# Patient Record
Sex: FEMALE | Race: WHITE | NOT HISPANIC OR LATINO | Employment: FULL TIME | URBAN - METROPOLITAN AREA
[De-identification: names, ages, dates, MRNs, and addresses within clinical notes are randomized per-mention and may not be internally consistent; named-entity substitution may affect disease eponyms.]

---

## 2023-03-10 PROBLEM — R32 URINARY INCONTINENCE: Status: ACTIVE | Noted: 2023-03-10

## 2023-03-10 PROBLEM — E55.9 VITAMIN D DEFICIENCY: Status: ACTIVE | Noted: 2023-03-10

## 2023-03-10 PROBLEM — N83.209 CYST OF OVARY: Status: ACTIVE | Noted: 2023-03-10

## 2023-03-10 PROBLEM — N83.2 CYST OF OVARY: Status: ACTIVE | Noted: 2023-03-10

## 2023-03-10 PROBLEM — R31.21 ASYMPTOMATIC MICROSCOPIC HEMATURIA: Status: ACTIVE | Noted: 2023-03-10

## 2023-03-10 PROBLEM — F51.04 CHRONIC INSOMNIA: Status: ACTIVE | Noted: 2023-03-10

## 2023-03-10 PROBLEM — D72.819 WBC DECREASED: Status: ACTIVE | Noted: 2023-03-10

## 2023-03-10 PROBLEM — M54.16 LUMBAR RADICULITIS: Status: ACTIVE | Noted: 2023-03-10

## 2023-03-10 PROBLEM — K21.9 CHRONIC GERD: Status: ACTIVE | Noted: 2023-03-10

## 2023-03-10 PROBLEM — R03.0 BLOOD PRESSURE ELEVATED WITHOUT HISTORY OF HTN: Status: ACTIVE | Noted: 2023-03-10

## 2023-03-10 PROBLEM — K58.2 IRRITABLE BOWEL SYNDROME WITH BOTH CONSTIPATION AND DIARRHEA: Status: ACTIVE | Noted: 2023-03-10

## 2023-03-10 PROBLEM — G43.909 MIGRAINE WITHOUT STATUS MIGRAINOSUS, NOT INTRACTABLE: Status: ACTIVE | Noted: 2023-03-10

## 2023-03-10 PROBLEM — Z86.19 HISTORY OF HPV INFECTION: Status: ACTIVE | Noted: 2023-03-10

## 2023-03-10 PROBLEM — J30.9 ALLERGIC RHINITIS: Status: ACTIVE | Noted: 2023-03-10

## 2023-03-10 RX ORDER — ACETAMINOPHEN 500 MG
2 TABLET ORAL
COMMUNITY
Start: 2021-05-06

## 2023-03-10 RX ORDER — MELATONIN 5 MG
1 CAPSULE ORAL NIGHTLY
COMMUNITY
Start: 2019-03-08 | End: 2023-04-17 | Stop reason: ALTCHOICE

## 2023-03-10 RX ORDER — CELECOXIB 100 MG/1
100 CAPSULE ORAL DAILY PRN
COMMUNITY
Start: 2020-10-30

## 2023-03-15 ENCOUNTER — OFFICE VISIT (OUTPATIENT)
Dept: PRIMARY CARE | Facility: CLINIC | Age: 58
End: 2023-03-15
Payer: COMMERCIAL

## 2023-03-15 VITALS
DIASTOLIC BLOOD PRESSURE: 73 MMHG | HEART RATE: 77 BPM | BODY MASS INDEX: 24.24 KG/M2 | WEIGHT: 141.2 LBS | TEMPERATURE: 98.1 F | OXYGEN SATURATION: 98 % | SYSTOLIC BLOOD PRESSURE: 115 MMHG

## 2023-03-15 DIAGNOSIS — M79.641 RIGHT HAND PAIN: Primary | ICD-10-CM

## 2023-03-15 PROCEDURE — 1036F TOBACCO NON-USER: CPT | Performed by: NURSE PRACTITIONER

## 2023-03-15 PROCEDURE — 99213 OFFICE O/P EST LOW 20 MIN: CPT | Performed by: NURSE PRACTITIONER

## 2023-03-15 ASSESSMENT — ENCOUNTER SYMPTOMS
TINGLING: 0
NUMBNESS: 0

## 2023-03-15 ASSESSMENT — PATIENT HEALTH QUESTIONNAIRE - PHQ9
SUM OF ALL RESPONSES TO PHQ9 QUESTIONS 1 AND 2: 0
1. LITTLE INTEREST OR PLEASURE IN DOING THINGS: NOT AT ALL
2. FEELING DOWN, DEPRESSED OR HOPELESS: NOT AT ALL

## 2023-03-15 NOTE — PROGRESS NOTES
Subjective   Patient ID: Lucio Goetz is a 57 y.o. female who presents for Hand Pain (Right Hand Pain x 1.5 months/Worsening/C/O of Heart burn/chest  pain in the waiting room today /States it could be food/drink related/Denies SOB//).    Hx of heart burn  Intermittent  Tums help  No other associated symptoms      Hand Pain   The incident occurred more than 1 week ago. The injury mechanism was repetitive motion. The pain is present in the right hand. The quality of the pain is described as shooting. The pain does not radiate. The pain is at a severity of 6/10. The pain is moderate. The pain has been Intermittent since the incident. Pertinent negatives include no numbness or tingling. The symptoms are aggravated by movement. She has tried nothing for the symptoms.       ROS completely negative except what was mentioned in the HPI.  Problem List, surgical, social, and family histories which were reviewed and updated as necessary within the EMR. I also personally reviewed the notes, labs, and imaging that pertained to what was documented or discussed in the HPI.      Objective   Physical Exam  Constitutional:       Appearance: Normal appearance. She is normal weight.   HENT:      Head: Normocephalic and atraumatic.      Right Ear: External ear normal.      Left Ear: External ear normal.      Nose: Nose normal.   Eyes:      Extraocular Movements: Extraocular movements intact.      Conjunctiva/sclera: Conjunctivae normal.      Pupils: Pupils are equal, round, and reactive to light.   Cardiovascular:      Rate and Rhythm: Normal rate and regular rhythm.      Heart sounds: Normal heart sounds.   Pulmonary:      Effort: Pulmonary effort is normal.      Breath sounds: Normal breath sounds.   Musculoskeletal:         General: No swelling or deformity.      Cervical back: Normal range of motion.      Comments: Pain on extension of middle finger, pain with reverse phalen's, normal phalens   Skin:     General: Skin is warm  and dry.   Neurological:      General: No focal deficit present.      Mental Status: She is alert and oriented to person, place, and time. Mental status is at baseline.   Psychiatric:         Mood and Affect: Mood normal.         Behavior: Behavior normal.         Thought Content: Thought content normal.         Judgment: Judgment normal.         /73 (BP Location: Left arm)   Pulse 77   Temp 36.7 °C (98.1 °F)   Wt 64 kg (141 lb 3.2 oz)   SpO2 98%   BMI 24.24 kg/m²     Assessment/Plan   Problem List Items Addressed This Visit          Musculoskeletal    Right hand pain - Primary     Will get XR  Unable to determine if pain originating in middle finger tendon or wrist  Ortho referral         Relevant Orders    XR hand right 1-2 views    Orthopaedic Injury Treatment

## 2023-03-15 NOTE — ASSESSMENT & PLAN NOTE
Will get XR  Unable to determine if pain originating in middle finger tendon or wrist  Ortho referral

## 2023-04-17 ENCOUNTER — OFFICE VISIT (OUTPATIENT)
Dept: PRIMARY CARE | Facility: CLINIC | Age: 58
End: 2023-04-17
Payer: COMMERCIAL

## 2023-04-17 VITALS
SYSTOLIC BLOOD PRESSURE: 138 MMHG | OXYGEN SATURATION: 99 % | DIASTOLIC BLOOD PRESSURE: 64 MMHG | WEIGHT: 145.8 LBS | BODY MASS INDEX: 25.03 KG/M2 | HEART RATE: 82 BPM | TEMPERATURE: 98.3 F
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
## 2023-04-17 DIAGNOSIS — S20.461A TICK BITE OF RIGHT BACK WALL OF THORAX, INITIAL ENCOUNTER: Primary | ICD-10-CM

## 2023-04-17 PROBLEM — Z12.4 SCREENING FOR CERVICAL CANCER: Status: ACTIVE | Noted: 2023-04-17

## 2023-04-17 PROBLEM — Z12.39 SCREENING FOR MALIGNANT NEOPLASM OF BREAST: Status: ACTIVE | Noted: 2023-04-17

## 2023-04-17 PROBLEM — Z87.42 H/O ABNORMAL CERVICAL PAPANICOLAOU SMEAR: Status: ACTIVE | Noted: 2023-04-17

## 2023-04-17 PROBLEM — Z00.00 ROUTINE HEALTH MAINTENANCE: Status: ACTIVE | Noted: 2023-04-17

## 2023-04-17 PROBLEM — Z78.0 MENOPAUSE: Status: ACTIVE | Noted: 2023-04-17

## 2023-04-17 PROBLEM — Z13.820 SCREENING FOR OSTEOPOROSIS: Status: ACTIVE | Noted: 2023-04-17

## 2023-04-17 PROCEDURE — 99213 OFFICE O/P EST LOW 20 MIN: CPT | Performed by: NURSE PRACTITIONER

## 2023-04-17 PROCEDURE — 87205 SMEAR GRAM STAIN: CPT

## 2023-04-17 PROCEDURE — 1036F TOBACCO NON-USER: CPT | Performed by: NURSE PRACTITIONER

## 2023-04-17 RX ORDER — DOXYCYCLINE 100 MG/1
100 CAPSULE ORAL 2 TIMES DAILY
Qty: 42 CAPSULE | Refills: 0 | Status: SHIPPED | OUTPATIENT
Start: 2023-04-17 | End: 2023-05-08

## 2023-04-17 NOTE — PROGRESS NOTES
"Assessment/Plan   Problem List Items Addressed This Visit    None  Visit Diagnoses       Tick bite of right back wall of thorax, initial encounter    -  Primary    warm compress to remove remaining (appears to be piece of leg)  start doxy  await identification  will get lyme panel depending on identification    Relevant Medications    doxycycline (Vibramycin) 100 mg capsule    Other Relevant Orders    Microscopic Arthropod Exam            Subjective   Patient ID: Lucio Goetz is a 57 y.o. female who presents for sick visit (Tick bite thinks maybe Thursday may have gotten it /Pulled it off this morning when she noticed it brought it in with her/Red spot on back where it was).  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      BP Readings from Last 3 Encounters:   04/17/23 138/64   03/15/23 115/73   05/16/22 114/70      Wt Readings from Last 3 Encounters:   04/17/23 66.1 kg (145 lb 12.8 oz)   03/15/23 64 kg (141 lb 3.2 oz)   05/16/22 64.4 kg (142 lb)      BMI:   Estimated body mass index is 25.03 kg/m² as calculated from the following:    Height as of 5/16/22: 1.626 m (5' 4\").    Weight as of this encounter: 66.1 kg (145 lb 12.8 oz).    Objective   Physical Exam  Constitutional:       Appearance: Normal appearance.   HENT:      Head: Normocephalic.      Right Ear: External ear normal.      Left Ear: External ear normal.      Nose: Nose normal.   Eyes:      Conjunctiva/sclera: Conjunctivae normal.   Cardiovascular:      Rate and Rhythm: Normal rate and regular rhythm.      Heart sounds: Normal heart sounds. "   Pulmonary:      Effort: Pulmonary effort is normal.      Breath sounds: Normal breath sounds.   Musculoskeletal:         General: Normal range of motion.      Cervical back: Normal range of motion.   Skin:     Comments: R upper back  Single indurated lesion with surrounding erythema, no warmth  Able to partially remove remaining tick, appearing to be piece of leg  Remaining  is a pinpoint spot of black  Cleansed, neosporin and bandaid applied  Patient tolerated    Neurological:      Mental Status: She is alert.

## 2023-04-20 LAB — GRAM STAIN, ONLY: NORMAL

## 2023-05-08 ENCOUNTER — TELEPHONE (OUTPATIENT)
Dept: PRIMARY CARE | Facility: CLINIC | Age: 58
End: 2023-05-08

## 2023-05-08 DIAGNOSIS — Z00.00 ROUTINE HEALTH MAINTENANCE: ICD-10-CM

## 2023-05-19 ENCOUNTER — LAB (OUTPATIENT)
Dept: LAB | Facility: LAB | Age: 58
End: 2023-05-19
Payer: COMMERCIAL

## 2023-05-19 DIAGNOSIS — Z00.00 ROUTINE HEALTH MAINTENANCE: ICD-10-CM
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
## 2023-05-19 DIAGNOSIS — S20.461A TICK BITE OF RIGHT BACK WALL OF THORAX, INITIAL ENCOUNTER: ICD-10-CM

## 2023-05-19 PROBLEM — E78.2 HYPERLIPIDEMIA, MIXED: Status: ACTIVE | Noted: 2023-05-19

## 2023-05-19 PROBLEM — R31.21 ASYMPTOMATIC MICROSCOPIC HEMATURIA: Status: RESOLVED | Noted: 2023-03-10 | Resolved: 2023-05-19

## 2023-05-19 LAB
ALANINE AMINOTRANSFERASE (SGPT) (U/L) IN SER/PLAS: 17 U/L (ref 7–45)
ALBUMIN (G/DL) IN SER/PLAS: 4.1 G/DL (ref 3.4–5)
ALKALINE PHOSPHATASE (U/L) IN SER/PLAS: 69 U/L (ref 33–110)
ANION GAP IN SER/PLAS: 11 MMOL/L (ref 10–20)
APPEARANCE, URINE: CLEAR
ASPARTATE AMINOTRANSFERASE (SGOT) (U/L) IN SER/PLAS: 20 U/L (ref 9–39)
BASOPHILS (10*3/UL) IN BLOOD BY AUTOMATED COUNT: 0.02 X10E9/L (ref 0–0.1)
BASOPHILS/100 LEUKOCYTES IN BLOOD BY AUTOMATED COUNT: 0.4 % (ref 0–2)
BILIRUBIN TOTAL (MG/DL) IN SER/PLAS: 0.4 MG/DL (ref 0–1.2)
BILIRUBIN, URINE: NEGATIVE
BLOOD, URINE: ABNORMAL
CALCIDIOL (25 OH VITAMIN D3) (NG/ML) IN SER/PLAS: 22 NG/ML
CALCIUM (MG/DL) IN SER/PLAS: 9.2 MG/DL (ref 8.6–10.3)
CARBON DIOXIDE, TOTAL (MMOL/L) IN SER/PLAS: 31 MMOL/L (ref 21–32)
CHLORIDE (MMOL/L) IN SER/PLAS: 105 MMOL/L (ref 98–107)
CHOLESTEROL (MG/DL) IN SER/PLAS: 234 MG/DL (ref 0–199)
CHOLESTEROL IN HDL (MG/DL) IN SER/PLAS: 58.5 MG/DL
CHOLESTEROL/HDL RATIO: 4
COLOR, URINE: YELLOW
CREATININE (MG/DL) IN SER/PLAS: 0.73 MG/DL (ref 0.5–1.05)
EOSINOPHILS (10*3/UL) IN BLOOD BY AUTOMATED COUNT: 0.17 X10E9/L (ref 0–0.7)
EOSINOPHILS/100 LEUKOCYTES IN BLOOD BY AUTOMATED COUNT: 3.2 % (ref 0–6)
ERYTHROCYTE DISTRIBUTION WIDTH (RATIO) BY AUTOMATED COUNT: 11.9 % (ref 11.5–14.5)
ERYTHROCYTE MEAN CORPUSCULAR HEMOGLOBIN CONCENTRATION (G/DL) BY AUTOMATED: 32.6 G/DL (ref 32–36)
ERYTHROCYTE MEAN CORPUSCULAR VOLUME (FL) BY AUTOMATED COUNT: 93 FL (ref 80–100)
ERYTHROCYTES (10*6/UL) IN BLOOD BY AUTOMATED COUNT: 4.28 X10E12/L (ref 4–5.2)
GFR FEMALE: >90 ML/MIN/1.73M2
GLUCOSE (MG/DL) IN SER/PLAS: 85 MG/DL (ref 74–99)
GLUCOSE, URINE: NEGATIVE MG/DL
HEMATOCRIT (%) IN BLOOD BY AUTOMATED COUNT: 39.6 % (ref 36–46)
HEMOGLOBIN (G/DL) IN BLOOD: 12.9 G/DL (ref 12–16)
IMMATURE GRANULOCYTES/100 LEUKOCYTES IN BLOOD BY AUTOMATED COUNT: 0.4 % (ref 0–0.9)
KETONES, URINE: NEGATIVE MG/DL
LDL: 149 MG/DL (ref 0–99)
LEUKOCYTE ESTERASE, URINE: NEGATIVE
LEUKOCYTES (10*3/UL) IN BLOOD BY AUTOMATED COUNT: 5.3 X10E9/L (ref 4.4–11.3)
LYMPHOCYTES (10*3/UL) IN BLOOD BY AUTOMATED COUNT: 1.99 X10E9/L (ref 1.2–4.8)
LYMPHOCYTES/100 LEUKOCYTES IN BLOOD BY AUTOMATED COUNT: 37.9 % (ref 13–44)
MONOCYTES (10*3/UL) IN BLOOD BY AUTOMATED COUNT: 0.46 X10E9/L (ref 0.1–1)
MONOCYTES/100 LEUKOCYTES IN BLOOD BY AUTOMATED COUNT: 8.8 % (ref 2–10)
MUCUS, URINE: NORMAL /LPF
NEUTROPHILS (10*3/UL) IN BLOOD BY AUTOMATED COUNT: 2.59 X10E9/L (ref 1.2–7.7)
NEUTROPHILS/100 LEUKOCYTES IN BLOOD BY AUTOMATED COUNT: 49.3 % (ref 40–80)
NITRITE, URINE: NEGATIVE
PH, URINE: 6 (ref 5–8)
PLATELETS (10*3/UL) IN BLOOD AUTOMATED COUNT: 186 X10E9/L (ref 150–450)
POTASSIUM (MMOL/L) IN SER/PLAS: 4.6 MMOL/L (ref 3.5–5.3)
PROTEIN TOTAL: 6.5 G/DL (ref 6.4–8.2)
PROTEIN, URINE: NEGATIVE MG/DL
RBC, URINE: 1 /HPF (ref 0–5)
SODIUM (MMOL/L) IN SER/PLAS: 142 MMOL/L (ref 136–145)
SPECIFIC GRAVITY, URINE: 1.01 (ref 1–1.03)
THYROTROPIN (MIU/L) IN SER/PLAS BY DETECTION LIMIT <= 0.05 MIU/L: 1.84 MIU/L (ref 0.44–3.98)
TRIGLYCERIDE (MG/DL) IN SER/PLAS: 135 MG/DL (ref 0–149)
UREA NITROGEN (MG/DL) IN SER/PLAS: 14 MG/DL (ref 6–23)
UROBILINOGEN, URINE: <2 MG/DL (ref 0–1.9)
VLDL: 27 MG/DL (ref 0–40)
WBC, URINE: 1 /HPF (ref 0–5)

## 2023-05-19 PROCEDURE — 82306 VITAMIN D 25 HYDROXY: CPT

## 2023-05-19 PROCEDURE — 80061 LIPID PANEL: CPT

## 2023-05-19 PROCEDURE — 84443 ASSAY THYROID STIM HORMONE: CPT

## 2023-05-19 PROCEDURE — 80053 COMPREHEN METABOLIC PANEL: CPT

## 2023-05-19 PROCEDURE — 81001 URINALYSIS AUTO W/SCOPE: CPT

## 2023-05-19 PROCEDURE — 85025 COMPLETE CBC W/AUTO DIFF WBC: CPT

## 2023-05-19 PROCEDURE — 86753 PROTOZOA ANTIBODY NOS: CPT

## 2023-05-19 PROCEDURE — 36415 COLL VENOUS BLD VENIPUNCTURE: CPT

## 2023-05-19 PROCEDURE — 87476 LYME DIS DNA AMP PROBE: CPT

## 2023-05-23 LAB
Lab: NORMAL
Lab: NORMAL

## 2023-05-24 LAB — LYME DISEASE (BORRELIA BURGDORFERI), PCR: NOT DETECTED

## 2023-05-25 ENCOUNTER — OFFICE VISIT (OUTPATIENT)
Dept: PRIMARY CARE | Facility: CLINIC | Age: 58
End: 2023-05-25
Payer: COMMERCIAL

## 2023-05-25 VITALS
HEIGHT: 65 IN | BODY MASS INDEX: 23.56 KG/M2 | HEART RATE: 78 BPM | SYSTOLIC BLOOD PRESSURE: 122 MMHG | WEIGHT: 141.38 LBS | TEMPERATURE: 98.2 F | OXYGEN SATURATION: 100 % | DIASTOLIC BLOOD PRESSURE: 78 MMHG

## 2023-05-25 DIAGNOSIS — G89.29 CHRONIC RIGHT-SIDED LOW BACK PAIN, UNSPECIFIED WHETHER SCIATICA PRESENT: ICD-10-CM

## 2023-05-25 DIAGNOSIS — Z78.0 MENOPAUSE: ICD-10-CM

## 2023-05-25 DIAGNOSIS — Z12.31 ENCOUNTER FOR SCREENING MAMMOGRAM FOR BREAST CANCER: ICD-10-CM

## 2023-05-25 DIAGNOSIS — Z23 IMMUNIZATION DUE: ICD-10-CM

## 2023-05-25 DIAGNOSIS — Z00.00 ROUTINE HEALTH MAINTENANCE: Primary | ICD-10-CM

## 2023-05-25 DIAGNOSIS — E78.2 HYPERLIPIDEMIA, MIXED: ICD-10-CM

## 2023-05-25 DIAGNOSIS — E55.9 VITAMIN D DEFICIENCY: ICD-10-CM

## 2023-05-25 DIAGNOSIS — M54.50 CHRONIC RIGHT-SIDED LOW BACK PAIN, UNSPECIFIED WHETHER SCIATICA PRESENT: ICD-10-CM

## 2023-05-25 PROBLEM — M79.641 RIGHT HAND PAIN: Status: RESOLVED | Noted: 2023-03-15 | Resolved: 2023-05-25

## 2023-05-25 PROCEDURE — 1036F TOBACCO NON-USER: CPT | Performed by: INTERNAL MEDICINE

## 2023-05-25 PROCEDURE — 99396 PREV VISIT EST AGE 40-64: CPT | Performed by: INTERNAL MEDICINE

## 2023-05-25 PROCEDURE — 90471 IMMUNIZATION ADMIN: CPT | Performed by: INTERNAL MEDICINE

## 2023-05-25 PROCEDURE — 90715 TDAP VACCINE 7 YRS/> IM: CPT | Performed by: INTERNAL MEDICINE

## 2023-05-25 RX ORDER — METRONIDAZOLE 7.5 MG/G
1 LOTION TOPICAL 2 TIMES DAILY
COMMUNITY
Start: 2023-02-07

## 2023-05-25 RX ORDER — CETIRIZINE HYDROCHLORIDE 10 MG/1
10 TABLET ORAL DAILY PRN
COMMUNITY
Start: 2010-06-09

## 2023-05-25 ASSESSMENT — PROMIS GLOBAL HEALTH SCALE
RATE_AVERAGE_PAIN: 3
RATE_AVERAGE_FATIGUE: MILD
EMOTIONAL_PROBLEMS: RARELY
CARRYOUT_SOCIAL_ACTIVITIES: VERY GOOD
RATE_GENERAL_HEALTH: VERY GOOD
RATE_SOCIAL_SATISFACTION: EXCELLENT
RATE_QUALITY_OF_LIFE: VERY GOOD
RATE_PHYSICAL_HEALTH: VERY GOOD
CARRYOUT_PHYSICAL_ACTIVITIES: COMPLETELY
RATE_MENTAL_HEALTH: VERY GOOD

## 2023-05-25 NOTE — ASSESSMENT & PLAN NOTE
Annual Wellness exam completed   Preventive Health history reviewed:  Vaccines today: TDAP  Labs ordered    Mammogram ordered  BMD ordered  Depression Screening done

## 2023-05-25 NOTE — PROGRESS NOTES
Reason for Visit: Annual Physical Exam    Assessment and Plan:  Problem List Items Addressed This Visit          High    Routine health maintenance - Primary    Overview     COVID 19 Pfizer 3/17/21, 4/9/21, 11/27/21 (Moderna), 7/5/22, 10/9/22  Shingrix 1/22/21, 6/4/21  DT(PEDIATRIC)1/1/1983   Influenza Seasonal Inj Age 3+9/12/2016, 10/1/20, 10/1/21, 10/9/22   Tetanus/Diphtheria Unspec3/17/2004  TDAP 3/14/14  PAP/HPV 2/2/215 (-); 5/20 (-)  Cscope 1/17 (10yrs)  Mamm 3/1/17, 9/20/22         Current Assessment & Plan     Annual Wellness exam completed   Preventive Health history reviewed:  Vaccines today: TDAP  Labs ordered    Mammogram ordered  BMD ordered  Depression Screening done         Relevant Orders    Urinalysis with Reflex Microscopic    Comprehensive Metabolic Panel    CBC and Auto Differential    Lipid Panel       Medium    Encounter for screening mammogram for breast cancer    Relevant Orders    BI mammo bilateral screening tomosynthesis    Hyperlipidemia, mixed    Overview     5/23:          Current Assessment & Plan     Decrease the intake of saturated fat in the diet to lower the LDL           Relevant Orders    TSH with reflex to Free T4 if abnormal    Lipid Panel    Menopause    Relevant Orders    XR DEXA bone density    Vitamin D deficiency    Overview     5/23: 22  Goal 40-50  On Supplement         Current Assessment & Plan     Start 2K daily         Relevant Orders    Vitamin D, Total     Other Visit Diagnoses       Immunization due        Relevant Orders    Tdap vaccine, age 10 years and older (BOOSTRIX) (Completed)    Chronic right-sided low back pain, unspecified whether sciatica present        Suspect SIJ dysfunction  Will image and r/o right kidney as source  Rec'd Sima Rodkey/PT    Relevant Orders    US renal complete    XR sacroiliac joints 3+ views          HPI: CPE  BP check with machine as well  Lower back pain - feels like a bruise  Chiro didn't help  Points to SIJ  Aches  XR  2020: Moderate facet disease lower lumbar spine   Saw Spine in 2022 (copied):  IMPRESSION:  This is a pleasant woman with history of chronic intermittent low back and history of previous left knee pain. Patient denies neurological compromise or red flag signs. Her pain symptoms have improved since onset.   -New lower thoracic bone lesion on CT for cardiac screeningâ€“probably incidental  -New finding today right sacroiliac dysfunction with sacroiliitis  -History of what sounded like lumbar radiculitis possibly L4 nerve root -resolved  - Left knee ?mild osteoarthritis vs anterior pain.   RECOMMENDATIONS:  -Get bone scan whole body to characterize lower thoracic lesion per CT and help exclude other malignancy  -Proceed with [facility] chiropractic consult scheduled at 11 AM this morning. She might go home and finish her snow shoveling before hand, I left her sacroiliac dysfunction in place for better diagnosis from the chiropractor  -Recommended home exercises per previous physical therapy and aNy new recommendations per chiropractic focusing on strengthening of back and and core  -Refilled Celebrex prn use only  - OTC voltaren gel for Knee  -She may call for right sacroiliac joint injection in the surgery center under fluoroscopy if pain persists. Then discussed possible prolotherapy as future option  - f/u PRN     Labs revierwed:  Component      Latest Ref Rng 5/19/2023   WBC      4.4 - 11.3 x10E9/L 5.3    RBC      4.00 - 5.20 x10E12/L 4.28    HEMOGLOBIN      12.0 - 16.0 g/dL 12.9    HEMATOCRIT      36.0 - 46.0 % 39.6    MCV      80 - 100 fL 93    MCHC      32.0 - 36.0 g/dL 32.6    Platelets      150 - 450 x10E9/L 186    RED CELL DISTRIBUTION WIDTH      11.5 - 14.5 % 11.9    Neutrophils %      40.0 - 80.0 % 49.3    Immature Granulocytes %, Automated      0.0 - 0.9 % 0.4    Lymphocytes %      13.0 - 44.0 % 37.9    Monocytes %      2.0 - 10.0 % 8.8    Eosinophils %      0.0 - 6.0 % 3.2    Basophils %      0.0 - 2.0 % 0.4     Neutrophils Absolute      1.20 - 7.70 x10E9/L 2.59    Lymphocytes Absolute      1.20 - 4.80 x10E9/L 1.99    Monocytes Absolute      0.10 - 1.00 x10E9/L 0.46    Eosinophils Absolute      0.00 - 0.70 x10E9/L 0.17    Basophils Absolute      0.00 - 0.10 x10E9/L 0.02    GLUCOSE      74 - 99 mg/dL 85    SODIUM      136 - 145 mmol/L 142    POTASSIUM      3.5 - 5.3 mmol/L 4.6    CHLORIDE      98 - 107 mmol/L 105    Bicarbonate      21 - 32 mmol/L 31    Anion Gap      10 - 20 mmol/L 11    Blood Urea Nitrogen      6 - 23 mg/dL 14    Creatinine      0.50 - 1.05 mg/dL 0.73    GFR Female      >90 mL/min/1.73m2 >90    Calcium      8.6 - 10.3 mg/dL 9.2    Albumin      3.4 - 5.0 g/dL 4.1    Alkaline Phosphatase      33 - 110 U/L 69    Total Protein      6.4 - 8.2 g/dL 6.5    AST      9 - 39 U/L 20    Bilirubin Total      0.0 - 1.2 mg/dL 0.4    ALT      7 - 45 U/L 17    Color, Urine      STRAW,YELLOW  YELLOW    Appearance, Urine      CLEAR  CLEAR    Specific Gravity, Urine      1.005 - 1.035  1.011    pH, Urine      5.0 - 8.0  6.0    Protein, Urine      NEGATIVE mg/dL NEGATIVE    Glucose, Urine      NEGATIVE mg/dL NEGATIVE    Blood, Urine      NEGATIVE  SMALL(1+) !    Ketones, Urine      NEGATIVE mg/dL NEGATIVE    Bilirubin, Urine      NEGATIVE  NEGATIVE    Urobilinogen, Urine      0.0 - 1.9 mg/dL <2.0    Nitrite, Urine      NEGATIVE  NEGATIVE    Leukocyte Esterase, Urine      NEGATIVE  NEGATIVE    CHOLESTEROL      0 - 199 mg/dL 234 (H)    HDL CHOLESTEROL      mg/dL 58.5    Cholesterol/HDL Ratio 4.0    LDL      0 - 99 mg/dL 149 (H)    VLDL      0 - 40 mg/dL 27    TRIGLYCERIDES      0 - 149 mg/dL 135    WBC, Urine      0 - 5 /HPF 1    RBC, Urine      0 - 5 /HPF 1    Mucus, Urine      /LPF 1+    Babesia microti IgG Antibodies      <1:16  < 1:16    Babesia microti IgM Antibodies      <1:20  <1:20    Lyme Disease (Borrelia burgdorferi), PCR Not Detected    Vitamin D, 25-Hydroxy, Total      ng/mL 22 !    Thyroid Stimulating Hormone      " 0.44 - 3.98 mIU/L 1.84      ROS otherwise negative aside from what was mentioned above in HPI.    Vitals  /78   Pulse 78   Temp 36.8 °C (98.2 °F)   Ht 1.638 m (5' 4.5\")   Wt 64.1 kg (141 lb 6 oz)   SpO2 100%   BMI 23.89 kg/m²   Body mass index is 23.89 kg/m².  Physical Exam  Gen: Alert, NAD  HEENT:  Normal exam  Neck:  No masses/nodes palpable; Thyroid nontender and without nodules; No GERALD  Respiratory:  Lungs CTAB  CV: RRR  Neuro:  Gross motor and sensory intact  Back: Promnience over the right SIJ, tender  Ext: RLE 1-2mm longer then left  No CVA tenderness  Skin:  No suspicious lesions present  Breast: No masses or axillary lymphadenopathy  Gyn: Normal pelvic exam: no uterine masses or cervical lesions, or CMT; no vaginal D/C. No ovarian or adnexal masses; No external vaginal or anal/perineal lesions (Pt declined chaperone)    Active Problem List  Patient Active Problem List   Diagnosis    Allergic rhinitis    Blood pressure elevated without history of HTN    Chronic GERD    Chronic insomnia    Cyst of ovary    History of HPV infection    Irritable bowel syndrome with both constipation and diarrhea    Lumbar radiculitis    Migraine without status migrainosus, not intractable    Urinary incontinence    Vitamin D deficiency    WBC decreased    H/O abnormal cervical Papanicolaou smear    Menopause    Routine health maintenance    Screening for malignant neoplasm of breast    Screening for cervical cancer    Screening for osteoporosis    Hyperlipidemia, mixed    Encounter for screening mammogram for breast cancer       Comprehensive Medical/Surgical/Social/Family History  Past Medical History:   Diagnosis Date    H/O bone scan     2/22: 1.No evidence of an acute inflammatory process involving axial or appendicular skeleton. 2. No abnormal radiotracer activity within thoracolumbar spine to correlate with sclerotic lesion seen unremarkable CT from 11/11/2021. 3. Mild radiotracer activity within 8th and 9th " right ribs may relate prior trauma. 4. Degenerative/arthritic changes axial skeleton as described    H/O CT scan     11/21: CT calcium score =0 1 cm sclerotic focus in the lower thoracic vertebral body. The etiology is indeterminate    H/O pelvic ultrasound     2003; 5.9x4.7x5.4cm unilocular cyst right ovary    H/O x-ray of lumbar spine     2020: Moderate facet disease lower lumbar spine     Past Surgical History:   Procedure Laterality Date    COLONOSCOPY  10/09/2018    1/17: [facility]- normal, repeat in 10 years    COLPOSCOPY  10/09/2018    Colposcopy     Social History     Social History Narrative    , 2 kids    Works as  (Retiring in 2023)    Former smoker, Quit age 23, smoked only for 2 years in college    Social ETOH    --------    Family History:    M: HTN, TIA    F: Raynaud's, PVCs    S:       Allergies and Medications  Patient has no known allergies.  Current Outpatient Medications   Medication Instructions    celecoxib (CELEBREX) 100 mg, oral, Daily PRN    cetirizine (ZYRTEC) 10 mg, oral, Daily PRN    cholecalciferol (Vitamin D-3) 50 mcg (2,000 unit) capsule 2 capsules, oral, Daily RT    metroNIDAZOLE (Metrolotion) 0.75 % lotion lotion 1 Application, Topical, 2 times daily, to affected area

## 2023-05-30 PROBLEM — N20.0 KIDNEY STONES: Status: ACTIVE | Noted: 2023-05-30

## 2023-11-07 ENCOUNTER — OUTSIDE PROCEDURE (OUTPATIENT)
Dept: PRIMARY CARE | Facility: CLINIC | Age: 58
End: 2023-11-07
Payer: COMMERCIAL

## 2023-11-29 ENCOUNTER — TELEMEDICINE (OUTPATIENT)
Dept: PRIMARY CARE | Facility: CLINIC | Age: 58
End: 2023-11-29
Payer: COMMERCIAL

## 2023-11-29 VITALS — TEMPERATURE: 97 F

## 2023-11-29 DIAGNOSIS — U07.1 COVID-19 VIRUS INFECTION: Primary | ICD-10-CM

## 2023-11-29 PROCEDURE — 99213 OFFICE O/P EST LOW 20 MIN: CPT | Performed by: NURSE PRACTITIONER

## 2023-11-29 ASSESSMENT — PATIENT HEALTH QUESTIONNAIRE - PHQ9
2. FEELING DOWN, DEPRESSED OR HOPELESS: NOT AT ALL
1. LITTLE INTEREST OR PLEASURE IN DOING THINGS: NOT AT ALL
SUM OF ALL RESPONSES TO PHQ9 QUESTIONS 1 AND 2: 0

## 2023-11-29 NOTE — PROGRESS NOTES
"An interactive audio/visual  telecommunication system which permits real time communications between the patient (at the originating site) and provider (at the distant site) was utilized to provide this telehealth service.    Verbal consent was requested and obtained from the patient for this telehealth visit.  We have confirmed the patient wishes to see me, Moon Bocanegra, a board certified nurse practitioner with an active [place] advanced practice license as well as verified the patient's identity and physical location in [place].    Problem List Items Addressed This Visit    None  Visit Diagnoses       COVID-19 virus infection    -  Primary    Sx onset: 11/26/23  Home test pos: 11/28/23  paxlovid sent 11/29/23  ED red flags discussed    Relevant Medications    nirmatrelvir-ritonavir (PAXLOVID) 300 mg (150 mg x 2)-100 mg tablet therapy pack             Subjective   Patient ID: Lucio Goetz is a 58 y.o. female who presents for Sick Visit (Covid positive Tested Tuesday/Sx started Sunday /Sx froggy throat/Runny nose cough/Temp running between 100-102/Daughter tested + monday).  HPI  Sx onset: 11/26/23  Home test pos: 11/28/23  Tmax 102 last night 2200  Hasn't taken tylenol or ibuprofen  Temp now 98  No dyspnea or wheeze  Cough is dry  Pushing fluid  Melatonin for sleep  - not helping        Review of Systems   All other systems reviewed and are negative.      BP Readings from Last 3 Encounters:   05/25/23 122/78   04/17/23 138/64   03/15/23 115/73      Wt Readings from Last 3 Encounters:   05/25/23 64.1 kg (141 lb 6 oz)   04/17/23 66.1 kg (145 lb 12.8 oz)   04/05/23 62.6 kg (138 lb)      BMI:   Estimated body mass index is 23.89 kg/m² as calculated from the following:    Height as of 5/25/23: 1.638 m (5' 4.5\").    Weight as of 5/25/23: 64.1 kg (141 lb 6 oz).    Objective   Physical Exam  Gen: Alert, NAD  Respiratory:  resp effort NL, speaking in complete sentences   Neuro:  coordination intact   Mood: normal  "   Sitting upright   Sounds nasal congested

## 2023-11-29 NOTE — PATIENT INSTRUCTIONS
Treat symptoms as you usually would with OTC agents (cough drops, cough      suppressant syrup, hot tea with honey, etc.)       Vitamin C 500mg twice daily, Vitamin D 2031-4725 IU daily, Quercetin 250-500mg twice      daily and Zinc 50-100mg daily have been found to be  effective.         If you experience acute worsening of symptoms (SOB/wheezing etc) go to the ER       Pulse ox > 90%            A typical course is a week getting sick then a week getting better. Symptoms often      worsen on days 3-5, then plateau for a few days, then start to improve.    ___  Discussed treatment options available for COVID infection, reviewed risks and benefits of the medication prescribed, and all questions were answered.  I made patient aware that this medication is not yet FDA approved and is currently only available under FDA Emergency Use Authorization.  This medication must be used with caution in those with hepatic disease and can cause hepatotoxicity, and with kidney disease (or a glomerular filtration rate <30 mL/min), or with untreated HIV disease.  Common side effects are disturbance of taste and diarrhea.  There are significant drug interactions and will need to monitor therapy or hold some of your current medications:     Reduce benzodiazepine dose by 50% or may need to be held during therapy.  Your Statin medication should be stopped 12 hours prior to beginning Paxlovid and discontinue for 5 days after therapy is completed.  Avoid Opioids  May interfere with Antidepressants  May increase effects of antipsychotic anxiolytic agents  Avoid Zolpidem    Patients on Coumadin are an ABSOLUTE contraindication       The effects of this medication are unknown in pregnant and lactating patients.

## 2024-06-13 ENCOUNTER — TELEPHONE (OUTPATIENT)
Dept: PRIMARY CARE | Facility: CLINIC | Age: 59
End: 2024-06-13
Payer: COMMERCIAL

## 2024-06-13 DIAGNOSIS — Z00.00 ROUTINE HEALTH MAINTENANCE: ICD-10-CM

## 2024-06-13 DIAGNOSIS — E55.9 VITAMIN D DEFICIENCY: ICD-10-CM

## 2024-06-13 DIAGNOSIS — E78.2 HYPERLIPIDEMIA, MIXED: ICD-10-CM

## 2024-06-13 NOTE — TELEPHONE ENCOUNTER
Patient called labs  on 24, patient requesting new labs be placed to complete before CPE with PCP on Monday. Please advise

## 2024-06-14 ENCOUNTER — LAB (OUTPATIENT)
Dept: LAB | Facility: LAB | Age: 59
End: 2024-06-14
Payer: COMMERCIAL

## 2024-06-14 DIAGNOSIS — E55.9 VITAMIN D DEFICIENCY: ICD-10-CM

## 2024-06-14 DIAGNOSIS — E78.2 HYPERLIPIDEMIA, MIXED: ICD-10-CM

## 2024-06-14 DIAGNOSIS — Z00.00 ROUTINE HEALTH MAINTENANCE: ICD-10-CM

## 2024-06-14 LAB
25(OH)D3 SERPL-MCNC: 26 NG/ML (ref 30–100)
ALBUMIN SERPL BCP-MCNC: 4.1 G/DL (ref 3.4–5)
ALP SERPL-CCNC: 73 U/L (ref 33–110)
ALT SERPL W P-5'-P-CCNC: 16 U/L (ref 7–45)
ANION GAP SERPL CALC-SCNC: 10 MMOL/L (ref 10–20)
APPEARANCE UR: CLEAR
AST SERPL W P-5'-P-CCNC: 19 U/L (ref 9–39)
BASOPHILS # BLD AUTO: 0.03 X10*3/UL (ref 0–0.1)
BASOPHILS NFR BLD AUTO: 0.6 %
BILIRUB SERPL-MCNC: 0.5 MG/DL (ref 0–1.2)
BILIRUB UR STRIP.AUTO-MCNC: NEGATIVE MG/DL
BUN SERPL-MCNC: 13 MG/DL (ref 6–23)
CALCIUM SERPL-MCNC: 9.2 MG/DL (ref 8.6–10.3)
CHLORIDE SERPL-SCNC: 106 MMOL/L (ref 98–107)
CHOLEST SERPL-MCNC: 225 MG/DL (ref 0–199)
CHOLESTEROL/HDL RATIO: 3.5
CO2 SERPL-SCNC: 30 MMOL/L (ref 21–32)
COLOR UR: COLORLESS
CREAT SERPL-MCNC: 0.64 MG/DL (ref 0.5–1.05)
EGFRCR SERPLBLD CKD-EPI 2021: >90 ML/MIN/1.73M*2
EOSINOPHIL # BLD AUTO: 0.25 X10*3/UL (ref 0–0.7)
EOSINOPHIL NFR BLD AUTO: 4.8 %
ERYTHROCYTE [DISTWIDTH] IN BLOOD BY AUTOMATED COUNT: 12.2 % (ref 11.5–14.5)
GLUCOSE SERPL-MCNC: 88 MG/DL (ref 74–99)
GLUCOSE UR STRIP.AUTO-MCNC: NORMAL MG/DL
HCT VFR BLD AUTO: 39.9 % (ref 36–46)
HDLC SERPL-MCNC: 63.8 MG/DL
HGB BLD-MCNC: 13 G/DL (ref 12–16)
IMM GRANULOCYTES # BLD AUTO: 0.01 X10*3/UL (ref 0–0.7)
IMM GRANULOCYTES NFR BLD AUTO: 0.2 % (ref 0–0.9)
KETONES UR STRIP.AUTO-MCNC: NEGATIVE MG/DL
LDLC SERPL CALC-MCNC: 136 MG/DL
LEUKOCYTE ESTERASE UR QL STRIP.AUTO: ABNORMAL
LYMPHOCYTES # BLD AUTO: 2.13 X10*3/UL (ref 1.2–4.8)
LYMPHOCYTES NFR BLD AUTO: 40.6 %
MCH RBC QN AUTO: 30.7 PG (ref 26–34)
MCHC RBC AUTO-ENTMCNC: 32.6 G/DL (ref 32–36)
MCV RBC AUTO: 94 FL (ref 80–100)
MONOCYTES # BLD AUTO: 0.52 X10*3/UL (ref 0.1–1)
MONOCYTES NFR BLD AUTO: 9.9 %
MUCOUS THREADS #/AREA URNS AUTO: NORMAL /LPF
NEUTROPHILS # BLD AUTO: 2.31 X10*3/UL (ref 1.2–7.7)
NEUTROPHILS NFR BLD AUTO: 43.9 %
NITRITE UR QL STRIP.AUTO: NEGATIVE
NON HDL CHOLESTEROL: 161 MG/DL (ref 0–149)
NRBC BLD-RTO: 0 /100 WBCS (ref 0–0)
PH UR STRIP.AUTO: 6.5 [PH]
PLATELET # BLD AUTO: 171 X10*3/UL (ref 150–450)
POTASSIUM SERPL-SCNC: 4.5 MMOL/L (ref 3.5–5.3)
PROT SERPL-MCNC: 6.4 G/DL (ref 6.4–8.2)
PROT UR STRIP.AUTO-MCNC: NEGATIVE MG/DL
RBC # BLD AUTO: 4.23 X10*6/UL (ref 4–5.2)
RBC # UR STRIP.AUTO: NEGATIVE /UL
RBC #/AREA URNS AUTO: NORMAL /HPF
RENAL EPI CELLS #/AREA UR COMP ASSIST: NORMAL /HPF
SODIUM SERPL-SCNC: 141 MMOL/L (ref 136–145)
SP GR UR STRIP.AUTO: 1.01
TRIGL SERPL-MCNC: 128 MG/DL (ref 0–149)
TSH SERPL-ACNC: 2.08 MIU/L (ref 0.44–3.98)
UROBILINOGEN UR STRIP.AUTO-MCNC: NORMAL MG/DL
VLDL: 26 MG/DL (ref 0–40)
WBC # BLD AUTO: 5.3 X10*3/UL (ref 4.4–11.3)
WBC #/AREA URNS AUTO: NORMAL /HPF

## 2024-06-14 PROCEDURE — 85025 COMPLETE CBC W/AUTO DIFF WBC: CPT

## 2024-06-14 PROCEDURE — 36415 COLL VENOUS BLD VENIPUNCTURE: CPT

## 2024-06-14 PROCEDURE — 80053 COMPREHEN METABOLIC PANEL: CPT

## 2024-06-14 PROCEDURE — 82306 VITAMIN D 25 HYDROXY: CPT

## 2024-06-14 PROCEDURE — 84443 ASSAY THYROID STIM HORMONE: CPT

## 2024-06-14 PROCEDURE — 80061 LIPID PANEL: CPT

## 2024-06-14 PROCEDURE — 81001 URINALYSIS AUTO W/SCOPE: CPT

## 2024-06-17 ENCOUNTER — APPOINTMENT (OUTPATIENT)
Dept: PRIMARY CARE | Facility: CLINIC | Age: 59
End: 2024-06-17
Payer: COMMERCIAL

## 2024-06-17 VITALS
BODY MASS INDEX: 24.28 KG/M2 | WEIGHT: 142.25 LBS | SYSTOLIC BLOOD PRESSURE: 103 MMHG | OXYGEN SATURATION: 99 % | DIASTOLIC BLOOD PRESSURE: 67 MMHG | HEIGHT: 64 IN | HEART RATE: 89 BPM | TEMPERATURE: 97.2 F

## 2024-06-17 DIAGNOSIS — E55.9 VITAMIN D DEFICIENCY: ICD-10-CM

## 2024-06-17 DIAGNOSIS — N20.0 KIDNEY STONES: ICD-10-CM

## 2024-06-17 DIAGNOSIS — M79.604 PAIN OF RIGHT LOWER EXTREMITY: ICD-10-CM

## 2024-06-17 DIAGNOSIS — Z12.31 ENCOUNTER FOR SCREENING MAMMOGRAM FOR BREAST CANCER: ICD-10-CM

## 2024-06-17 DIAGNOSIS — E78.2 HYPERLIPIDEMIA, MIXED: ICD-10-CM

## 2024-06-17 DIAGNOSIS — Z00.00 ROUTINE HEALTH MAINTENANCE: Primary | ICD-10-CM

## 2024-06-17 PROBLEM — R03.0 BLOOD PRESSURE ELEVATED WITHOUT HISTORY OF HTN: Status: RESOLVED | Noted: 2023-03-10 | Resolved: 2024-06-17

## 2024-06-17 PROCEDURE — 99396 PREV VISIT EST AGE 40-64: CPT | Performed by: INTERNAL MEDICINE

## 2024-06-17 PROCEDURE — 1036F TOBACCO NON-USER: CPT | Performed by: INTERNAL MEDICINE

## 2024-06-17 RX ORDER — VIT C/E/ZN/COPPR/LUTEIN/ZEAXAN 250MG-90MG
1000 CAPSULE ORAL
Start: 2024-06-17

## 2024-06-17 ASSESSMENT — PROMIS GLOBAL HEALTH SCALE
RATE_AVERAGE_FATIGUE: MILD
RATE_SOCIAL_SATISFACTION: GOOD
CARRYOUT_PHYSICAL_ACTIVITIES: COMPLETELY
RATE_PHYSICAL_HEALTH: GOOD
RATE_QUALITY_OF_LIFE: VERY GOOD
EMOTIONAL_PROBLEMS: SOMETIMES
RATE_AVERAGE_PAIN: 2
CARRYOUT_SOCIAL_ACTIVITIES: GOOD
RATE_MENTAL_HEALTH: GOOD
RATE_GENERAL_HEALTH: GOOD

## 2024-06-17 NOTE — PROGRESS NOTES
ANNUAL CPE VISIT  Pain across right quad muscle  No injury  Not r/t activity  Doesn't notice at night  3/10 olivia burning and achy andthen goes away  Seeing ali for her neck  R lumbar 2020:   IMPRESSION:  Moderate facet disease lower lumbar spine  After last year Went off Vit D  Diet change for lipids  Never saw MR for chronic back pain  Affecting sleep    Labs reviewed:  Component      Latest Ref Rng 6/14/2024   LEUKOCYTES (10*3/UL) IN BLOOD BY AUTOMATED COUNT, Danish      4.4 - 11.3 x10*3/uL 5.3    nRBC      0.0 - 0.0 /100 WBCs 0.0    ERYTHROCYTES (10*6/UL) IN BLOOD BY AUTOMATED COUNT, Danish      4.00 - 5.20 x10*6/uL 4.23    HEMOGLOBIN      12.0 - 16.0 g/dL 13.0    HEMATOCRIT      36.0 - 46.0 % 39.9    MCV      80 - 100 fL 94    MCH      26.0 - 34.0 pg 30.7    MCHC      32.0 - 36.0 g/dL 32.6    RED CELL DISTRIBUTION WIDTH      11.5 - 14.5 % 12.2    PLATELETS (10*3/UL) IN BLOOD AUTOMATED COUNT, Danish      150 - 450 x10*3/uL 171    NEUTROPHILS/100 LEUKOCYTES IN BLOOD BY AUTOMATED COUNT, Danish      40.0 - 80.0 % 43.9    Immature Granulocytes %, Automated      0.0 - 0.9 % 0.2    Lymphocytes %      13.0 - 44.0 % 40.6    Monocytes %      2.0 - 10.0 % 9.9    Eosinophils %      0.0 - 6.0 % 4.8    Basophils %      0.0 - 2.0 % 0.6    NEUTROPHILS (10*3/UL) IN BLOOD BY AUTOMATED COUNT, Danish      1.20 - 7.70 x10*3/uL 2.31    Immature Granulocytes Absolute, Automated      0.00 - 0.70 x10*3/uL 0.01    Lymphocytes Absolute      1.20 - 4.80 x10*3/uL 2.13    Monocytes Absolute      0.10 - 1.00 x10*3/uL 0.52    Eosinophils Absolute      0.00 - 0.70 x10*3/uL 0.25    Basophils Absolute      0.00 - 0.10 x10*3/uL 0.03    GLUCOSE      74 - 99 mg/dL 88    SODIUM      136 - 145 mmol/L 141    POTASSIUM      3.5 - 5.3 mmol/L 4.5    CHLORIDE      98 - 107 mmol/L 106    Bicarbonate      21 - 32 mmol/L 30    Anion Gap      10 - 20 mmol/L 10    Blood Urea Nitrogen      6 - 23 mg/dL 13    Creatinine      0.50 - 1.05 mg/dL 0.64     EGFR      >60 mL/min/1.73m*2 >90    Calcium      8.6 - 10.3 mg/dL 9.2    Albumin      3.4 - 5.0 g/dL 4.1    Alkaline Phosphatase      33 - 110 U/L 73    Total Protein      6.4 - 8.2 g/dL 6.4    AST      9 - 39 U/L 19    Bilirubin Total      0.0 - 1.2 mg/dL 0.5    ALT      7 - 45 U/L 16    Color, Urine      Light-Yellow, Yellow, Dark-Yellow  Colorless ! (N)    Appearance, Urine      Clear  Clear    Specific Gravity, Urine      1.005 - 1.035  1.008    pH, Urine      5.0, 5.5, 6.0, 6.5, 7.0, 7.5, 8.0  6.5    Protein, Urine      NEGATIVE, 10 (TRACE), 20 (TRACE) mg/dL NEGATIVE    Glucose, Urine      Normal mg/dL Normal    Blood, Urine      NEGATIVE  NEGATIVE    Ketones, Urine      NEGATIVE mg/dL NEGATIVE    Bilirubin, Urine      NEGATIVE  NEGATIVE    Urobilinogen, Urine      Normal mg/dL Normal    Nitrite, Urine      NEGATIVE  NEGATIVE    Leukocyte Esterase, Urine      NEGATIVE  75 Bryon/µL !    CHOLESTEROL      0 - 199 mg/dL 225 (H)    HDL CHOLESTEROL      mg/dL 63.8    Cholesterol/HDL Ratio 3.5    LDL Calculated      <=99 mg/dL 136 (H)    VLDL      0 - 40 mg/dL 26    TRIGLYCERIDES      0 - 149 mg/dL 128    Non HDL Cholesterol      0 - 149 mg/dL 161 (H)    WBC, Urine      1-5, NONE /HPF 1-5    RBC, Urine      NONE, 1-2, 3-5 /HPF NONE    Renal Epithelial Cells, Urine      Reference range not established. /HPF 1-2 (FEW)    Mucus, Urine      Reference range not established. /LPF FEW    Thyroid Stimulating Hormone      0.44 - 3.98 mIU/L 2.08    Vitamin D, 25-Hydroxy, Total      30 - 100 ng/mL 26 (L)         Assessment and Plan:  Problem List Items Addressed This Visit          High    Routine health maintenance - Primary    Overview     COVID 19 Pfizer 3/17/21, 4/9/21, 11/27/21 (Moderna), 7/5/22, 10/9/22, 11/19/23  Shingrix 1/22/21, 6/4/21  Influenza Seasonal Inj Age 3+9/12/2016, 10/1/20, 10/1/21, 10/9/22, 9/23/23  Tetanus/Diphtheria Unspec3/17/2004  TDAP 3/14/14, 5/25/23  DT(PEDIATRIC)1/1/1983  PAP/HPV 2/2/215 (-); 5/20  "(-)  Cscope 1/17 (10yrs)  Mamm 3/1/17, 9/20/22, 9/2023  BMD 5/25/23  BP Cuff has been checked for accuracy 5/6/21   Hers: 119/90 hr 87   Ours: 111/78 hr 86          Current Assessment & Plan     Annual Wellness exam completed   Preventive Health History reviewed  Ordered:   Labs    Mammogram   PAP due in 2025           Relevant Orders    Comprehensive Metabolic Panel    CBC and Auto Differential    Lipid Panel    Urinalysis with Reflex Culture and Microscopic       Medium    Encounter for screening mammogram for breast cancer    Relevant Orders    BI mammo bilateral screening tomosynthesis    Hyperlipidemia, mixed    Overview     11/21 CT calcium score =0  5/23:   6/24:          Current Assessment & Plan     Will try tumeric, cinnamon, and xiomara supplements         Relevant Orders    TSH with reflex to Free T4 if abnormal    Kidney stones    Overview     5/23 US kidney: Solitary tiny 3mm nonobstructing stone in each kidney. No hydronephrosis   Good hydration rec'd         Vitamin D deficiency    Overview     5/23: 22  Goal 30-40  On Supplement         Current Assessment & Plan     Start 1K daily         Relevant Medications    cholecalciferol (Vitamin D-3) 25 MCG (1000 UT) capsule    Other Relevant Orders    Vitamin D 25-Hydroxy,Total (for eval of Vitamin D levels)     Other Visit Diagnoses       Pain of right lower extremity        With LLD ? if alignment cuasing nerve impingement  will see Marco Dejesus and see if she is able to address  If not consider MRI          ROS otherwise negative aside from what was mentioned above in HPI.    Vitals  /67   Pulse 89   Temp 36.2 °C (97.2 °F)   Ht 1.626 m (5' 4\")   Wt 64.5 kg (142 lb 4 oz)   SpO2 99%   BMI 24.42 kg/m²   Body mass index is 24.42 kg/m².  Physical Exam  Gen: Alert, NAD  HEENT:  Normal exam  Neck:  No masses/nodes palpable; Thyroid nontender and without nodules; No GERALD  Respiratory:  Lungs CTAB  CV: RRR  Neuro:  Gross motor and sensory " intact  BacK:Negative SLR, caused pain in outer buttock, Negative Krishna sign, RLE longer then LLE, no SIJ tenderneess and no TB tenderness  Skin:  No suspicious lesions present  Breast: No masses or axillary lymphadenopathy  Gyn: Normal pelvic exam: no uterine masses or cervical lesions, or CMT; no vaginal D/C. No ovarian or adnexal masses; No external vaginal or anal/perineal lesions (Pt declined chaperone)      Allergies and Medications  Patient has no known allergies.  Current Outpatient Medications   Medication Instructions    celecoxib (CELEBREX) 100 mg, oral, Daily PRN    cetirizine (ZYRTEC) 10 mg, oral, Daily PRN    cholecalciferol (VITAMIN D-3) 25 mcg, oral, Daily RT    metroNIDAZOLE (Metrolotion) 0.75 % lotion lotion 1 Application, Topical, 2 times daily, to affected area       Active Problem List  Patient Active Problem List   Diagnosis    Allergic rhinitis    Chronic GERD    Chronic insomnia    Cyst of ovary    History of HPV infection    Irritable bowel syndrome with both constipation and diarrhea    Lumbar radiculitis    Migraine without status migrainosus, not intractable    Urinary incontinence    Vitamin D deficiency    WBC decreased    H/O abnormal cervical Papanicolaou smear    Menopause    Routine health maintenance    Screening for malignant neoplasm of breast    Screening for cervical cancer    Screening for osteoporosis    Hyperlipidemia, mixed    Encounter for screening mammogram for breast cancer    Kidney stones       Comprehensive Medical/Surgical/Social/Family History  Past Medical History:   Diagnosis Date    H/O bone density study 05/26/2023    normal    H/O bone scan     2/22: 1.No evidence of an acute inflammatory process involving axial or appendicular skeleton. 2. No abnormal radiotracer activity within thoracolumbar spine to correlate with sclerotic lesion seen unremarkable CT from 11/11/2021. 3. Mild radiotracer activity within 8th and 9th right ribs may relate prior trauma. 4.  Degenerative/arthritic changes axial skeleton as described    H/O CT scan     11/21: CT calcium score =0 1 cm sclerotic focus in the lower thoracic vertebral body. The etiology is indeterminate    H/O diagnostic ultrasound 05/30/2023    US kidney: Solitary tiny 3mm nonobstructing stone in each kidney. No hydronephrosis    H/O pelvic ultrasound     2003; 5.9x4.7x5.4cm unilocular cyst right ovary    H/O x-ray of lumbar spine     2020: Moderate facet disease lower lumbar spine     Past Surgical History:   Procedure Laterality Date    COLONOSCOPY  10/09/2018    1/17: Tabbaa- normal, repeat in 10 years    COLPOSCOPY  10/09/2018    Colposcopy       Social History     Social History Narrative    Social History:    , 2 kids    Retired      Former smoker, Quit age 23, smoked only for 2 years in college    Social ETOH    --------    Family History:    M: HTN, TIA, Breast CA age 79    F: Raynaud's, PVCs    S:

## 2024-06-17 NOTE — ASSESSMENT & PLAN NOTE
Annual Wellness exam completed   Preventive Health History reviewed  Ordered:   Labs    Mammogram   PAP due in 2025

## 2024-09-24 ENCOUNTER — HOSPITAL ENCOUNTER (OUTPATIENT)
Dept: RADIOLOGY | Facility: CLINIC | Age: 59
Discharge: HOME | End: 2024-09-24
Payer: COMMERCIAL

## 2024-09-24 DIAGNOSIS — Z12.31 ENCOUNTER FOR SCREENING MAMMOGRAM FOR BREAST CANCER: ICD-10-CM

## 2024-09-24 PROCEDURE — 77067 SCR MAMMO BI INCL CAD: CPT | Performed by: RADIOLOGY

## 2024-09-24 PROCEDURE — 77063 BREAST TOMOSYNTHESIS BI: CPT | Performed by: RADIOLOGY

## 2024-09-24 PROCEDURE — 77067 SCR MAMMO BI INCL CAD: CPT

## 2024-09-25 ENCOUNTER — APPOINTMENT (OUTPATIENT)
Dept: RADIOLOGY | Facility: CLINIC | Age: 59
End: 2024-09-25
Payer: COMMERCIAL

## 2024-11-05 ENCOUNTER — HOSPITAL ENCOUNTER (OUTPATIENT)
Dept: RADIOLOGY | Facility: CLINIC | Age: 59
Discharge: HOME | End: 2024-11-05
Payer: COMMERCIAL

## 2024-11-05 DIAGNOSIS — M99.06 SEGMENTAL AND SOMATIC DYSFUNCTION OF LOWER EXTREMITY: ICD-10-CM

## 2024-11-05 DIAGNOSIS — M54.17 RADICULOPATHY, LUMBOSACRAL REGION: ICD-10-CM

## 2024-11-05 PROCEDURE — 73502 X-RAY EXAM HIP UNI 2-3 VIEWS: CPT | Mod: LT

## 2024-11-05 PROCEDURE — 73502 X-RAY EXAM HIP UNI 2-3 VIEWS: CPT | Mod: LEFT SIDE | Performed by: STUDENT IN AN ORGANIZED HEALTH CARE EDUCATION/TRAINING PROGRAM

## 2025-03-10 ENCOUNTER — APPOINTMENT (OUTPATIENT)
Dept: PRIMARY CARE | Facility: CLINIC | Age: 60
End: 2025-03-10
Payer: COMMERCIAL

## 2025-03-10 ENCOUNTER — HOSPITAL ENCOUNTER (OUTPATIENT)
Dept: RADIOLOGY | Facility: CLINIC | Age: 60
Discharge: HOME | End: 2025-03-10
Payer: COMMERCIAL

## 2025-03-10 ENCOUNTER — OFFICE VISIT (OUTPATIENT)
Dept: ORTHOPEDIC SURGERY | Facility: CLINIC | Age: 60
End: 2025-03-10
Payer: COMMERCIAL

## 2025-03-10 VITALS — HEIGHT: 64 IN | WEIGHT: 140 LBS | BODY MASS INDEX: 23.9 KG/M2

## 2025-03-10 DIAGNOSIS — M25.532 LEFT WRIST PAIN: ICD-10-CM

## 2025-03-10 DIAGNOSIS — M65.939 TENOSYNOVITIS OF WRIST FLEXOR: Primary | ICD-10-CM

## 2025-03-10 PROCEDURE — L3908 WHO COCK-UP NONMOLDE PRE OTS: HCPCS | Performed by: INTERNAL MEDICINE

## 2025-03-10 PROCEDURE — 99213 OFFICE O/P EST LOW 20 MIN: CPT | Performed by: INTERNAL MEDICINE

## 2025-03-10 PROCEDURE — 3008F BODY MASS INDEX DOCD: CPT | Performed by: INTERNAL MEDICINE

## 2025-03-10 PROCEDURE — 99204 OFFICE O/P NEW MOD 45 MIN: CPT | Performed by: INTERNAL MEDICINE

## 2025-03-10 PROCEDURE — 73110 X-RAY EXAM OF WRIST: CPT | Mod: LEFT SIDE | Performed by: INTERNAL MEDICINE

## 2025-03-10 PROCEDURE — 73110 X-RAY EXAM OF WRIST: CPT | Mod: LT

## 2025-03-10 RX ORDER — METHYLPREDNISOLONE 4 MG/1
TABLET ORAL
Qty: 1 EACH | Refills: 0 | Status: SHIPPED | OUTPATIENT
Start: 2025-03-10

## 2025-03-10 ASSESSMENT — PATIENT HEALTH QUESTIONNAIRE - PHQ9
1. LITTLE INTEREST OR PLEASURE IN DOING THINGS: NOT AT ALL
2. FEELING DOWN, DEPRESSED OR HOPELESS: NOT AT ALL
SUM OF ALL RESPONSES TO PHQ9 QUESTIONS 1 AND 2: 0

## 2025-03-10 NOTE — PROGRESS NOTES
Acute Injury New Patient Visit    CC:   Chief Complaint   Patient presents with    Left Wrist - Pain     Patient was having pain in her left wrist from walking the dog and pulled hard.  Xrays today         HPI: Lucio is a 59 y.o. female presents today for evaluation for acute left wrist injury sustained two two weeks after she was pulled hard by her dog. She notes persistent left wrist pain. She is here for initial evaluation and x-rays.         Review of Systems   GENERAL: Negative for malaise, significant weight loss, fever  MUSCULOSKELETAL: See HPI  NEURO:  Negative for numbness / tingling     Past Medical History  Past Medical History:   Diagnosis Date    H/O bone density study 05/26/2023    normal    H/O bone scan     2/22: 1.No evidence of an acute inflammatory process involving axial or appendicular skeleton. 2. No abnormal radiotracer activity within thoracolumbar spine to correlate with sclerotic lesion seen unremarkable CT from 11/11/2021. 3. Mild radiotracer activity within 8th and 9th right ribs may relate prior trauma. 4. Degenerative/arthritic changes axial skeleton as described    H/O CT scan     11/21: CT calcium score =0 1 cm sclerotic focus in the lower thoracic vertebral body. The etiology is indeterminate    H/O diagnostic ultrasound 05/30/2023    US kidney: Solitary tiny 3mm nonobstructing stone in each kidney. No hydronephrosis    H/O pelvic ultrasound     2003; 5.9x4.7x5.4cm unilocular cyst right ovary    H/O x-ray of lumbar spine     2020: Moderate facet disease lower lumbar spine       Medication review  Medication Documentation Review Audit       Reviewed by Madelyn Molina MD (Physician) on 06/17/24 at 1029      Medication Order Taking? Sig Documenting Provider Last Dose Status   celecoxib (CeleBREX) 100 mg capsule [id number] Yes Take 1 capsule (100 mg) by mouth once daily as needed. Historical Provider, MD  Active   cetirizine (ZyrTEC) 10 mg tablet [id number] Yes Take 1 tablet (10 mg) by  mouth once daily as needed. Historical Provider, MD  Active   cholecalciferol (Vitamin D-3) 50 mcg (2,000 unit) capsule [id number] Yes Take 2 capsules (100 mcg) by mouth once daily. Historical Provider, MD  Active   metroNIDAZOLE (Metrolotion) 0.75 % lotion lotion [id number] Yes Apply 1 Application topically 2 times a day. to affected area Historical Provider, MD  Active                    Allergies  Allergies   Allergen Reactions    Seasonale (91) [Levonorgestrel-Ethinyl Estrad] Itching and Runny nose       Social History  Social History     Socioeconomic History    Marital status:      Spouse name: Not on file    Number of children: Not on file    Years of education: Not on file    Highest education level: Not on file   Occupational History    Not on file   Tobacco Use    Smoking status: Former     Types: Cigarettes    Smokeless tobacco: Never   Vaping Use    Vaping status: Never Used   Substance and Sexual Activity    Alcohol use: Yes     Comment: 3 drinks weekly    Drug use: Never    Sexual activity: Defer   Other Topics Concern    Not on file   Social History Narrative    Social History:    , 2 kids    Retired      Former smoker, Quit age 23, smoked only for 2 years in college    Social ETOH    --------    Family History:    M: HTN, TIA, Breast CA age 79    F: Raynaud's, PVCs    S:     Social Drivers of Health     Financial Resource Strain: Not on file   Food Insecurity: Not on file   Transportation Needs: Not on file   Physical Activity: Not on file   Stress: Not on file   Social Connections: Not on file   Intimate Partner Violence: Not on file   Housing Stability: Not on file       Surgical History  Past Surgical History:   Procedure Laterality Date    COLONOSCOPY  10/09/2018    1/17: Tabbaa- normal, repeat in 10 years    COLPOSCOPY  10/09/2018    Colposcopy       Physical Exam:  GENERAL:  Patient is awake, alert, and oriented to person place and time.  Patient appears well nourished  and well kept.  Affect Calm, Not Acutely Distressed.  HEENT:  Normocephalic, Atraumatic, EOMI  CARDIOVASCULAR:  Hemodynamically stable.  RESPIRATORY:  Normal respirations with unlabored breathing.  Extremity: Left hand and wrist shows skin is intact.  No erythema or warmth.  There is no clinical signs infection.  There is no pain of the distal radius or distal ulna.  There is no pain the scaphoid bone.  No pain of the metacarpal bones.  Mild pain over the flexor tendons of the left wrist.  Negative Phalen's test.  Negative Rosa sign.  EPL tendons intact.  She can pronate and supinate with no pain discomfort.  Distal pulses are palpable.  Right hand and wrist was examined for comparison.      Diagnostics: X-rays reviewed      Procedure: None    Assessment: Left wrist flexor tenosynovitis    Plan: Lucio presents today for evaluation for acute left wrist injury sustained two two weeks after she was pulled hard by her dog. X-rays showed no obvious fractures. We recommended a wrist brace for support, Medrol dose Zackery for the acute inflammation, and occupational therapy for her wrist flexor tenosynovitis. She will follow-up in 4-5 weeks with Dr Meehan for reevaluation. If no improvement, may consider possible cortisone injection to the flexor tendon sheath.     Orders Placed This Encounter    XR wrist left 3+ views      At the conclusion of the visit there were no further questions by the patient/family regarding their plan of care.  Patient was instructed to call or return with any issues, questions, or concerns regarding their injury and/or treatment plan described above.     03/10/25 at 3:52 PM - Jaquelin Steele MD  Scribe Attestation  By signing my name below, I Donte Deepa Willams   attest that this documentation has been prepared under the direction and in the presence of Jaquelin Steele MD.    Office: [phone]    This note was prepared using voice recognition software.  The details of this note  are correct and have been reviewed, and corrected to the best of my ability.  Some grammatical errors may persist related to the Dragon software.

## 2025-04-01 ENCOUNTER — EVALUATION (OUTPATIENT)
Dept: OCCUPATIONAL THERAPY | Facility: CLINIC | Age: 60
End: 2025-04-01
Payer: COMMERCIAL

## 2025-04-01 DIAGNOSIS — M65.939 TENOSYNOVITIS OF WRIST FLEXOR: ICD-10-CM

## 2025-04-01 DIAGNOSIS — M25.532 PAIN IN JOINT OF LEFT WRIST: Primary | ICD-10-CM

## 2025-04-01 PROCEDURE — 97165 OT EVAL LOW COMPLEX 30 MIN: CPT | Mod: GO

## 2025-04-01 PROCEDURE — 97035 APP MDLTY 1+ULTRASOUND EA 15: CPT | Mod: GO

## 2025-04-01 PROCEDURE — 97112 NEUROMUSCULAR REEDUCATION: CPT | Mod: GO

## 2025-04-01 ASSESSMENT — ENCOUNTER SYMPTOMS
OCCASIONAL FEELINGS OF UNSTEADINESS: 0
DEPRESSION: 0
LOSS OF SENSATION IN FEET: 0

## 2025-04-01 NOTE — PROGRESS NOTES
Occupational Therapy                     Patient Name: Lucio Goetz  MRN: [id number]  Today's Date: 4/1/2025     Goals:  Long Term  Problem #1:  Decreased home exercise program knowledge  Goal #1:  The patient to demonstrate with 100% accuracy exercises to increase strength and prevent joint deformities throughout the wrist and thumb.  Treatment Intervention:  Home exercise program education along with range-of-motion activities education.    Problem #2:  Increased Quick DASH score  Goal #2:  The patient to demonstrate an increase in left upper extremity function as indicated by decreased Quick DASH score ranging between 11-22 at the conclusion of all treatment sessions. Current Quick DASH score is 30.     Short Term  Problem #3: Increased pain level.  Goal #1:  Patient to verbalize a decreased pain level in the left upper extremity by two levels.  Current pain level 3/10.   Treatment intervention: Neuromuscular reeducation, pain decreasing modalities, patient education and coping skill education.    Assessment:   This patient presents with a long term condition that was exacerbated while walking her dog when the leash was jerked.  She has displayed appropriate coping abilities in dealing with the effects of this injury.  Standardized testing and measures administered today, including Quick Dash, reveal that the patient has minimal impairments in body structures and functions, activity limitations, and participation restrictions.  The Quick Dash was scored at 30.  The patient has a 2+ month  history of the present problem as is without any personal factors and/or comorbidities that impact the plan of care.  The client was independent prior to the onset of this is impairment.  Two forms of identification were provided and she verbalized no complaints during the intake assessment of abuse or neglect.    Areas affected by this include limited muscle strength, decreased home task tolerance.  The patient's clinical  presentation includes stable & uncomplicated characteristics as noted during today's evaluation, including pain with decreased strength.   These deficits are effecting her home abilities at various times during the day and may be considered as presenting with a condition that is stable & uncomplicated.   Treatment options vary for this client with various treatment protocols available. Time spent evaluating this client and providing treatment, evaluation and education 60 minutes.  The combination of these findings indicate that this patient has 1 performance deficits and is of low complexity, and skilled OT services were warranted in order to realize measurable change in the above outcome measures and achieve improvements in the patient's functional status and individual goals.  The patient verbalized understanding and is in agreement with all goals and plan of care.    Frequency of care was developed with input and agreement by the patient.    Plan of Care    Frequency and duration: time(s) a week . 1-2 times a week for 12 weeks or for 12 visits.     Plan of care was developed with input and agreement by the patient.     Client's primary Goal: Return to previous level of function and independence, use the left wrist, thumb and hand again.     Reason For Visit    Initial Evaluation, Evaluation and Treatment.        Client Input    The patient's primary form of communication is English. There are no language barriers. Social interaction is appropriate with good interactive skills noted.    Difficulty With Movement, Self Care, Home Management,  Activity, ADL'S    Past Med/Surgical History: Reviewed  Current Medications  Please see patient medication and intake questionnaire for current medications.    Adult Risk Screening  There are no spiritual/cultural practices/values/needs that are important to know.  No apparent abuse or neglect is noted, no suicidal ideation or self-harm plans referenced.      Fall Risk: none      Insurance  Insurance reviewed   Visit number:  1     Treatment    Time in clinic started at: 1315  Time in clinic ended at:  7365  03362 - OT Eval Low Complexity 20 min.  Timed: 03800 Neuromuscular reeducation, 15  44490 Ultrasound, 15    Referring Physician: Jaquelin Steele M.D.  Insurance: [facility]   Onset Date: 2/15/2025  CPT codes that may be used:  NMR 51471  ICD10 Dx Code: M65.939  Visit Requested: 12  Functional Outcome Tool(s): Quick Dash  score:  30   Did the patient have a surgical procedure in the last three (3) months related to the condition for which services are being requested: No  Select all conditions that apply: Select All That Apply: Musculoskeletal disorders  Was treatment billed with eval?  Yes  Number of Short Term Goals established at eval: 2   Number of Short Term Goals met to date: 0  Number of Long Term Goals established at eval: 2    Number of Long Term Goals met to date: 0     Subjective    Patient reports: The pain went away then returned after walking the dog.  It is bad at times and her hand is stiff.    Objective  Neuro: Intact    Strength:    Strength:   Level II lbs. on the right:  60  Level II lbs. on the left: 50     composite strength:  Right: WFL  Left: WFL    Left Upper Extremity: Painful extension, ulnar deviation and thumb Abduction. Palpation pain at midwrist a SLL region    Functional Task Tolerance:     Not Limited Progressing Painful Limited   Carrying       Gripping   x    Lifting       Manipulation   x Keyboarding and /or twisting rag or cloth.   Pinching       Pulling       Pushing       Reaching       Weightbearing   x      Treatment Performed Today:  Information communicated to patient.   Education provided included home program   Home program: PROM , tendon gliding , AAROM , AROM , modalities available and possible usage, orthosis if needed, strengthening.     Igor FUENTES/L, CHT, Date: 04/01/2025  1. Pain in joint of left wrist        2.  Tenosynovitis of wrist flexor  Referral to Occupational Therapy    Follow Up In Occupational Therapy

## 2025-04-10 ENCOUNTER — APPOINTMENT (OUTPATIENT)
Dept: ORTHOPEDIC SURGERY | Facility: CLINIC | Age: 60
End: 2025-04-10
Payer: COMMERCIAL

## 2025-04-16 ENCOUNTER — TREATMENT (OUTPATIENT)
Dept: OCCUPATIONAL THERAPY | Facility: CLINIC | Age: 60
End: 2025-04-16
Payer: COMMERCIAL

## 2025-04-16 DIAGNOSIS — M25.532 PAIN IN JOINT OF LEFT WRIST: Primary | ICD-10-CM

## 2025-04-16 DIAGNOSIS — M65.939 TENOSYNOVITIS OF WRIST FLEXOR: ICD-10-CM

## 2025-04-16 PROCEDURE — 97112 NEUROMUSCULAR REEDUCATION: CPT | Mod: GO

## 2025-04-16 PROCEDURE — 97035 APP MDLTY 1+ULTRASOUND EA 15: CPT | Mod: GO

## 2025-04-16 NOTE — PROGRESS NOTES
Occupational Therapy                  Patient Name: Lucio Goetz  MRN: [id number]  Today's Date: 4/16/2025     Reason For Visit  Occupational therapy follow visit.      Client Input  Client's primary Goal: Return to previous level of function and independence.     Adult Risk Screening  There are no spiritual/cultural practices/values/needs that are important to know   Initial Fall Risk Screening:   The client has not fallen in the last 6 months. The client has no fall injury. The client does not have a fear of falling. The client does not need assistance with sitting, standing or walking. The client does not need assistance walking in her home. The client does not need assistance in an unfamiliar setting. The patient is not using an assistive device.        Fall Risk: none     Insurance: Reviewed   Visit number: 2    Subjective: I am having less pain and doing well. The therapy has helped.  The patient's primary form of communication is English. There are no language barriers. Social interaction is appropriate with good interactive skills noted. Difficulty With Movement, Home Management, Work, ADL'S    Objective:     Interventions  Right  Lbs. 60  Left  Lbs.  55  Right lateral pinch 16 pounds  Left lateral pinch 15 pounds  Modalities: Ultrasound  Joint Mobilizations: [L] Wrist and forearm  Therapeutic Exercises AROM, PROM, Strengthening, Stretching, Grasp, Pinch  Patient Education:. Composite flexion and HEP exercises to continue. Resistive exercises with ball encouraged for strengthening.   Therapist instructed patient on home exercise program, patient verbalized understanding. Primary form of preferred communication utilized, English.     Assessment:  The client tolerated well.  Motion gains noted with stretching and soft tissue mobilizations. Focused grasp and release tasks to progress for functional abilities and in hand manipulation.   Activity tolerance increasing for home and daily tasks.  All  therapeutic exercises, modalities and activities performed this date to reduce pain, increase hand function, increase strength and independence in ADL and IADL tasks.    Plan:  The client has progressed to meet most OT goals.  She will continue with her HEP and cancel next session if she continues to be symptom free.      Time in clinic started at: 1015  Time in clinic ended at:  1030  Timed: 33246 Neuromuscular reeducation, 15  12856 Ultrasound, 15  Total time in clinic in minutes: 30    Igor Orellana OTR/L, CHT

## 2025-04-17 ENCOUNTER — APPOINTMENT (OUTPATIENT)
Dept: ORTHOPEDIC SURGERY | Facility: CLINIC | Age: 60
End: 2025-04-17
Payer: COMMERCIAL

## 2025-04-23 ENCOUNTER — APPOINTMENT (OUTPATIENT)
Dept: OCCUPATIONAL THERAPY | Facility: CLINIC | Age: 60
End: 2025-04-23
Payer: COMMERCIAL

## 2025-04-30 ENCOUNTER — APPOINTMENT (OUTPATIENT)
Dept: OCCUPATIONAL THERAPY | Facility: CLINIC | Age: 60
End: 2025-04-30
Payer: COMMERCIAL

## 2025-05-07 ENCOUNTER — APPOINTMENT (OUTPATIENT)
Dept: OCCUPATIONAL THERAPY | Facility: CLINIC | Age: 60
End: 2025-05-07
Payer: COMMERCIAL

## 2025-06-20 ENCOUNTER — APPOINTMENT (OUTPATIENT)
Dept: PRIMARY CARE | Facility: CLINIC | Age: 60
End: 2025-06-20
Payer: COMMERCIAL

## 2025-06-20 VITALS
TEMPERATURE: 96.9 F | HEART RATE: 77 BPM | SYSTOLIC BLOOD PRESSURE: 100 MMHG | DIASTOLIC BLOOD PRESSURE: 60 MMHG | BODY MASS INDEX: 24.75 KG/M2 | WEIGHT: 145 LBS | HEIGHT: 64 IN | OXYGEN SATURATION: 96 %

## 2025-06-20 DIAGNOSIS — M25.552 PAIN OF LEFT HIP: ICD-10-CM

## 2025-06-20 DIAGNOSIS — E78.2 HYPERLIPIDEMIA, MIXED: ICD-10-CM

## 2025-06-20 DIAGNOSIS — N20.0 KIDNEY STONES: ICD-10-CM

## 2025-06-20 DIAGNOSIS — M47.816 LUMBAR FACET ARTHROPATHY: ICD-10-CM

## 2025-06-20 DIAGNOSIS — Z00.00 ROUTINE HEALTH MAINTENANCE: Primary | ICD-10-CM

## 2025-06-20 DIAGNOSIS — Z12.4 SCREENING FOR CERVICAL CANCER: ICD-10-CM

## 2025-06-20 DIAGNOSIS — E55.9 VITAMIN D DEFICIENCY: ICD-10-CM

## 2025-06-20 DIAGNOSIS — Z12.31 ENCOUNTER FOR SCREENING MAMMOGRAM FOR BREAST CANCER: ICD-10-CM

## 2025-06-20 DIAGNOSIS — Z78.0 MENOPAUSE: ICD-10-CM

## 2025-06-20 DIAGNOSIS — Z13.820 SCREENING FOR OSTEOPOROSIS: ICD-10-CM

## 2025-06-20 PROBLEM — M25.532 PAIN IN JOINT OF LEFT WRIST: Status: RESOLVED | Noted: 2025-04-01 | Resolved: 2025-06-20

## 2025-06-20 PROBLEM — D72.819 WBC DECREASED: Status: RESOLVED | Noted: 2023-03-10 | Resolved: 2025-06-20

## 2025-06-20 PROBLEM — K21.9 CHRONIC GERD: Status: RESOLVED | Noted: 2023-03-10 | Resolved: 2025-06-20

## 2025-06-20 PROBLEM — M65.939 TENOSYNOVITIS OF WRIST FLEXOR: Status: RESOLVED | Noted: 2025-04-01 | Resolved: 2025-06-20

## 2025-06-20 PROBLEM — G43.909 MIGRAINE WITHOUT STATUS MIGRAINOSUS, NOT INTRACTABLE: Status: RESOLVED | Noted: 2023-03-10 | Resolved: 2025-06-20

## 2025-06-20 LAB
25(OH)D3+25(OH)D2 SERPL-MCNC: 32 NG/ML (ref 30–100)
ALBUMIN SERPL-MCNC: 4.4 G/DL (ref 3.6–5.1)
ALP SERPL-CCNC: 74 U/L (ref 37–153)
ALT SERPL-CCNC: 21 U/L (ref 6–29)
ANION GAP SERPL CALCULATED.4IONS-SCNC: 8 MMOL/L (CALC) (ref 7–17)
APPEARANCE UR: CLEAR
AST SERPL-CCNC: 23 U/L (ref 10–35)
BACTERIA #/AREA URNS HPF: ABNORMAL /HPF
BACTERIA UR CULT: ABNORMAL
BACTERIA UR CULT: ABNORMAL
BASOPHILS # BLD AUTO: 30 CELLS/UL (ref 0–200)
BASOPHILS NFR BLD AUTO: 0.6 %
BILIRUB SERPL-MCNC: 0.6 MG/DL (ref 0.2–1.2)
BILIRUB UR QL STRIP: NEGATIVE
BUN SERPL-MCNC: 9 MG/DL (ref 7–25)
CALCIUM SERPL-MCNC: 9.3 MG/DL (ref 8.6–10.4)
CHLORIDE SERPL-SCNC: 105 MMOL/L (ref 98–110)
CHOLEST SERPL-MCNC: 236 MG/DL
CHOLEST/HDLC SERPL: 3.8 (CALC)
CO2 SERPL-SCNC: 30 MMOL/L (ref 20–32)
COLOR UR: YELLOW
CREAT SERPL-MCNC: 0.67 MG/DL (ref 0.5–1.03)
EGFRCR SERPLBLD CKD-EPI 2021: 101 ML/MIN/1.73M2
EOSINOPHIL # BLD AUTO: 170 CELLS/UL (ref 15–500)
EOSINOPHIL NFR BLD AUTO: 3.4 %
ERYTHROCYTE [DISTWIDTH] IN BLOOD BY AUTOMATED COUNT: 12.6 % (ref 11–15)
GLUCOSE SERPL-MCNC: 87 MG/DL (ref 65–99)
GLUCOSE UR QL STRIP: NEGATIVE
HCT VFR BLD AUTO: 42.6 % (ref 35–45)
HDLC SERPL-MCNC: 62 MG/DL
HGB BLD-MCNC: 13.9 G/DL (ref 11.7–15.5)
HGB UR QL STRIP: NEGATIVE
HYALINE CASTS #/AREA URNS LPF: ABNORMAL /LPF
KETONES UR QL STRIP: NEGATIVE
LDLC SERPL CALC-MCNC: 151 MG/DL (CALC)
LEUKOCYTE ESTERASE UR QL STRIP: ABNORMAL
LYMPHOCYTES # BLD AUTO: 1880 CELLS/UL (ref 850–3900)
LYMPHOCYTES NFR BLD AUTO: 37.6 %
MCH RBC QN AUTO: 30.9 PG (ref 27–33)
MCHC RBC AUTO-ENTMCNC: 32.6 G/DL (ref 32–36)
MCV RBC AUTO: 94.7 FL (ref 80–100)
MONOCYTES # BLD AUTO: 445 CELLS/UL (ref 200–950)
MONOCYTES NFR BLD AUTO: 8.9 %
NEUTROPHILS # BLD AUTO: 2475 CELLS/UL (ref 1500–7800)
NEUTROPHILS NFR BLD AUTO: 49.5 %
NITRITE UR QL STRIP: NEGATIVE
NONHDLC SERPL-MCNC: 174 MG/DL (CALC)
PH UR STRIP: 6 [PH] (ref 5–8)
PLATELET # BLD AUTO: 200 THOUSAND/UL (ref 140–400)
PMV BLD REES-ECKER: 11.9 FL (ref 7.5–12.5)
POTASSIUM SERPL-SCNC: 4.4 MMOL/L (ref 3.5–5.3)
PROT SERPL-MCNC: 7 G/DL (ref 6.1–8.1)
PROT UR QL STRIP: NEGATIVE
RBC # BLD AUTO: 4.5 MILLION/UL (ref 3.8–5.1)
RBC #/AREA URNS HPF: ABNORMAL /HPF
SERVICE CMNT-IMP: ABNORMAL
SODIUM SERPL-SCNC: 143 MMOL/L (ref 135–146)
SP GR UR STRIP: 1.01 (ref 1–1.03)
SQUAMOUS #/AREA URNS HPF: ABNORMAL /HPF
TRIGL SERPL-MCNC: 111 MG/DL
TSH SERPL-ACNC: 1.46 MIU/L (ref 0.4–4.5)
WBC # BLD AUTO: 5 THOUSAND/UL (ref 3.8–10.8)
WBC #/AREA URNS HPF: ABNORMAL /HPF

## 2025-06-20 PROCEDURE — 1036F TOBACCO NON-USER: CPT | Performed by: INTERNAL MEDICINE

## 2025-06-20 PROCEDURE — 3008F BODY MASS INDEX DOCD: CPT | Performed by: INTERNAL MEDICINE

## 2025-06-20 PROCEDURE — 99396 PREV VISIT EST AGE 40-64: CPT | Performed by: INTERNAL MEDICINE

## 2025-06-20 RX ORDER — KETOCONAZOLE 20 MG/ML
SHAMPOO, SUSPENSION TOPICAL
COMMUNITY
Start: 2025-06-06

## 2025-06-20 RX ORDER — VIT C/E/ZN/COPPR/LUTEIN/ZEAXAN 250MG-90MG
CAPSULE ORAL
Start: 2025-06-20

## 2025-06-20 RX ORDER — FLUOCINONIDE TOPICAL SOLUTION USP, 0.05% 0.5 MG/ML
SOLUTION TOPICAL
COMMUNITY
Start: 2025-05-29

## 2025-06-20 ASSESSMENT — PATIENT HEALTH QUESTIONNAIRE - PHQ9
SUM OF ALL RESPONSES TO PHQ9 QUESTIONS 1 AND 2: 0
2. FEELING DOWN, DEPRESSED OR HOPELESS: NOT AT ALL
1. LITTLE INTEREST OR PLEASURE IN DOING THINGS: NOT AT ALL

## 2025-06-20 ASSESSMENT — PROMIS GLOBAL HEALTH SCALE
CARRYOUT_PHYSICAL_ACTIVITIES: COMPLETELY
RATE_GENERAL_HEALTH: GOOD
RATE_AVERAGE_PAIN: 2
RATE_PHYSICAL_HEALTH: GOOD
RATE_SOCIAL_SATISFACTION: EXCELLENT
RATE_MENTAL_HEALTH: GOOD
CARRYOUT_SOCIAL_ACTIVITIES: GOOD
RATE_AVERAGE_FATIGUE: MODERATE
RATE_QUALITY_OF_LIFE: EXCELLENT
EMOTIONAL_PROBLEMS: SOMETIMES

## 2025-06-20 NOTE — ASSESSMENT & PLAN NOTE
Annual Wellness exam completed   Preventive Health History reviewed  Ordered:   Labs    Mammogram   BMD   Cscope due in 2027  PAP done

## 2025-06-20 NOTE — PROGRESS NOTES
ANNUAL CPE VISIT  Chief Complaint   Patient presents with    Annual Exam     Annual cpe  Last couple of weeks has had feeling of numbness in right big toe     HPI: Reviewed chart/medical care received since last seen by me.  Left hip pain perissts  Right big toe bothersome  Sensation is different  Partial  + back pain all the time  Worse after sleeping all night  Better with lseeping and sitting    XR 2020: Moderate facet disease in the lower lumbar spine     Labs reviewed:  Component      Latest Ref Rng 6/19/2025   WHITE BLOOD CELL COUNT      3.8 - 10.8 Thousand/uL 5.0    RED BLOOD CELL COUNT      3.80 - 5.10 Million/uL 4.50    HEMOGLOBIN      11.7 - 15.5 g/dL 13.9    HEMATOCRIT      35.0 - 45.0 % 42.6    MCV      80.0 - 100.0 fL 94.7    MCH      27.0 - 33.0 pg 30.9    MCHC      32.0 - 36.0 g/dL 32.6    RDW      11.0 - 15.0 % 12.6    PLATELET COUNT      140 - 400 Thousand/uL 200    MPV      7.5 - 12.5 fL 11.9    ABSOLUTE NEUTROPHILS      1,500 - 7,800 cells/uL 2,475    ABSOLUTE LYMPHOCYTES      850 - 3,900 cells/uL 1,880    ABSOLUTE MONOCYTES      200 - 950 cells/uL 445    ABSOLUTE EOSINOPHILS      15 - 500 cells/uL 170    ABSOLUTE BASOPHILS      0 - 200 cells/uL 30    NEUTROPHILS      % 49.5    LYMPHOCYTES      % 37.6    MONOCYTES      % 8.9    EOSINOPHILS      % 3.4    BASOPHILS      % 0.6    COLOR      YELLOW  YELLOW    APPEARANCE      CLEAR  CLEAR    SPECIFIC GRAVITY      1.001 - 1.035  1.010    PH      5.0 - 8.0  6.0    GLUCOSE      NEGATIVE  NEGATIVE    BILIRUBIN      NEGATIVE  NEGATIVE    KETONES      NEGATIVE  NEGATIVE    OCCULT BLOOD      NEGATIVE  NEGATIVE    PROTEIN      NEGATIVE  NEGATIVE    NITRITE      NEGATIVE  NEGATIVE    LEUKOCYTE ESTERASE      NEGATIVE  1+ !    WBC      < OR = 5 /HPF 0-5    RBC      < OR = 2 /HPF NONE SEEN    SQUAMOUS EPITHELIAL CELLS      < OR = 5 /HPF 0-5    BACTERIA      NONE SEEN /HPF NONE SEEN    HYALINE CAST      NONE SEEN /LPF NONE SEEN    NOTE --    CULTURE, URINE, ROUTINE  SEE NOTE (P)   CULTURE, URINE, ROUTINE --    GLUCOSE      65 - 99 mg/dL 87    UREA NITROGEN (BUN)      7 - 25 mg/dL 9    CREATININE      0.50 - 1.03 mg/dL 0.67    EGFR      > OR = 60 mL/min/1.73m2 101    SODIUM      135 - 146 mmol/L 143    POTASSIUM      3.5 - 5.3 mmol/L 4.4    CHLORIDE      98 - 110 mmol/L 105    CARBON DIOXIDE      20 - 32 mmol/L 30    ELECTROLYTE BALANCE      7 - 17 mmol/L (calc) 8    CALCIUM      8.6 - 10.4 mg/dL 9.3    PROTEIN, TOTAL      6.1 - 8.1 g/dL 7.0    ALBUMIN      3.6 - 5.1 g/dL 4.4    BILIRUBIN, TOTAL      0.2 - 1.2 mg/dL 0.6    ALKALINE PHOSPHATASE      37 - 153 U/L 74    AST      10 - 35 U/L 23    ALT      6 - 29 U/L 21    CHOLESTEROL, TOTAL      <200 mg/dL 236 (H)    HDL CHOLESTEROL      > OR = 50 mg/dL 62    TRIGLYCERIDES      <150 mg/dL 111    LDL-CHOLESTEROL      mg/dL (calc) 151 (H)    CHOL/HDLC RATIO      <5.0 (calc) 3.8    NON HDL CHOLESTEROL      <130 mg/dL (calc) 174 (H)    TSH      0.40 - 4.50 mIU/L 1.46    VITAMIN D,25-OH,TOTAL,IA      30 - 100 ng/mL 32       BP at home much lower    Assessment and Plan:  Problem List Items Addressed This Visit          High    Routine health maintenance - Primary    Overview   COVID 19 Pfizer 3/17/21, 4/9/21, 11/27/21 (Moderna), 7/5/22, 10/9/22, 11/19/23, 10/29/24  Shingrix 1/22/21, 6/4/21, 10/29/24  Influenza 9/12/2016, 10/1/20, 10/1/21, 10/9/22, 9/23/23, 10/29/24  TDAP 3/14/14, 5/25/23  DT(PEDIATRIC)1/1/1983  Tetanus/Diphtheria Unspec3/17/2004  PAP/HPV 2/2/215 (-); 5/20 (-)  Cscope 1/17 (10yrs)  Mamm 3/1/17, 9/20/22, 9/2023, 9/24/24  BMD 5/25/23  BP Cuff has been checked for accuracy 5/6/21   Hers: 119/90 hr 87   Ours: 111/78 hr 86   11/21 CT calcium score =0          Current Assessment & Plan   Annual Wellness exam completed   Preventive Health History reviewed  Ordered:   Labs    Mammogram   BMD   Cscope due in 2027  PAP done           Relevant Orders    Comprehensive Metabolic Panel    CBC and Auto Differential    Lipid Panel     "Urinalysis with Reflex Microscopic       Medium    Encounter for screening mammogram for breast cancer    Relevant Orders    BI mammo bilateral screening tomosynthesis    Hyperlipidemia, mixed    Overview   11/21 CT calcium score =0  5/23:   6/24:          Current Assessment & Plan   Can try RYR         Relevant Medications    red yeast rice 600 mg capsule    Other Relevant Orders    TSH with reflex to Free T4 if abnormal    Kidney stones    Overview   5/23 US kidney: Solitary tiny 3mm nonobstructing stone in each kidney. No hydronephrosis   Good hydration rec'd         Relevant Orders    Vitamin D 25-Hydroxy,Total (for eval of Vitamin D levels)    Menopause    Relevant Orders    XR DEXA bone density    Screening for cervical cancer    Relevant Orders    THINPREP PAP TEST    Screening for osteoporosis    Relevant Orders    XR DEXA bone density    Vitamin D deficiency    Overview   5/23: 22  Goal 30-40  On Supplement         Relevant Orders    Vitamin D 25-Hydroxy,Total (for eval of Vitamin D levels)     Other Visit Diagnoses         Pain of left hip        Relevant Orders    Referral to Physical Therapy    XR hip left with pelvis when performed 2 or 3 views      Lumbar facet arthropathy        suspect this is cause for great toe sensory neuropathy  will start with PT and reassess  If sx persist would get MRI    Relevant Orders    Referral to Physical Therapy    XR lumbar spine 2-3 views            ROS otherwise negative aside from what was mentioned above in HPI.    Vitals  /60   Pulse 77   Temp 36.1 °C (96.9 °F)   Ht 1.632 m (5' 4.25\")   Wt 65.8 kg (145 lb)   LMP 11/28/2018   SpO2 96%   BMI 24.70 kg/m²   Body mass index is 24.7 kg/m².  Physical Exam  Gen: Alert, NAD  HEENT:  Normal exam  Neck:  No masses/nodes palpable; Thyroid nontender and without nodules; No GERALD  Respiratory:  Lungs CTAB  CV: RRR  Neuro:  Gross motor and sensory intact except decreesed sensation to vibration and light " tough on the right great toe ventral surface  Ext: LLE longer than RLE  Skin:  No suspicious lesions present  Breast: No masses or axillary lymphadenopathy  Gyn: Normal pelvic exam: no uterine masses or cervical lesions, or CMT; no vaginal D/C. No ovarian or adnexal masses; No external vaginal or anal/perineal lesions (Pt declined chaperone)    Allergies and Medications  Seasonale (91) [levonorgestrel-ethinyl estrad]  Current Outpatient Medications   Medication Instructions    celecoxib (CELEBREX) 100 mg, Daily PRN    cetirizine (ZYRTEC) 10 mg, Daily PRN    cholecalciferol (VITAMIN D-3) 25 mcg, oral, Daily RT    fluocinonide (Lidex) 0.05 % external solution APPLY 2X DAILY AS NEEDED FOR ITCHING    ketoconazole (NIZOral) 2 % shampoo APPLY TO WET HAIR & LATHER THREE TIMES A WEEK LEAVE IN FOR 10 MINUTES THEN WASH OUT    metroNIDAZOLE (Metrolotion) 0.75 % lotion lotion 1 Application, 2 times daily    red yeast rice 600 mg capsule Take daily       Active Problem List  Problem List[1]      Comprehensive Medical/Surgical/Social/Family History  Medical History[2]  Surgical History[3]  Social History     Social History Narrative    Social History:    , 2 kids    Retired      Former smoker, Quit age 23, smoked only for 2 years in college    Social ETOH    --------    Family History:    M: HTN, TIA, Breast CA age 79    F: Raynaud's, PVCs    S:          [1]   Patient Active Problem List  Diagnosis    Allergic rhinitis    Chronic insomnia    Cyst of ovary    History of HPV infection    Irritable bowel syndrome with both constipation and diarrhea    Lumbar radiculitis    Urinary incontinence    Vitamin D deficiency    H/O abnormal cervical Papanicolaou smear    Menopause    Routine health maintenance    Screening for malignant neoplasm of breast    Screening for cervical cancer    Screening for osteoporosis    Hyperlipidemia, mixed    Encounter for screening mammogram for breast cancer    Kidney stones   [2]    Past Medical History:  Diagnosis Date    H/O bone density study 05/26/2023    normal    H/O bone scan     2/22: 1.No evidence of an acute inflammatory process involving axial or appendicular skeleton. 2. No abnormal radiotracer activity within thoracolumbar spine to correlate with sclerotic lesion seen unremarkable CT from 11/11/2021. 3. Mild radiotracer activity within 8th and 9th right ribs may relate prior trauma. 4. Degenerative/arthritic changes axial skeleton as described    H/O CT scan     11/21: CT calcium score =0 1 cm sclerotic focus in the lower thoracic vertebral body. The etiology is indeterminate    H/O diagnostic ultrasound 05/30/2023    US kidney: Solitary tiny 3mm nonobstructing stone in each kidney. No hydronephrosis    H/O pelvic ultrasound     2003; 5.9x4.7x5.4cm unilocular cyst right ovary    H/O x-ray of lumbar spine     2020: Moderate facet disease lower lumbar spine   [3]   Past Surgical History:  Procedure Laterality Date    COLONOSCOPY  10/09/2018    1/17: [facility]- normal, repeat in 10 years    COLPOSCOPY  10/09/2018    Colposcopy

## 2025-07-07 LAB
CYTOLOGY CMNT CVX/VAG CYTO-IMP: NORMAL
HPV HR 12 DNA GENITAL QL NAA+PROBE: NEGATIVE
HPV HR GENOTYPES PNL CVX NAA+PROBE: NEGATIVE
HPV16 DNA SPEC QL NAA+PROBE: NEGATIVE
HPV18 DNA SPEC QL NAA+PROBE: NEGATIVE
LAB AP HPV GENOTYPE QUESTION: YES
LAB AP HPV HR: NORMAL
LABORATORY COMMENT REPORT: NORMAL
PATH REPORT.TOTAL CANCER: NORMAL

## 2026-07-14 ENCOUNTER — APPOINTMENT (OUTPATIENT)
Dept: PRIMARY CARE | Facility: CLINIC | Age: 61
End: 2026-07-14
Payer: COMMERCIAL